# Patient Record
Sex: FEMALE | Race: WHITE | NOT HISPANIC OR LATINO | Employment: OTHER | URBAN - METROPOLITAN AREA
[De-identification: names, ages, dates, MRNs, and addresses within clinical notes are randomized per-mention and may not be internally consistent; named-entity substitution may affect disease eponyms.]

---

## 2017-05-05 DIAGNOSIS — Z12.31 ENCOUNTER FOR SCREENING MAMMOGRAM FOR BREAST CANCER: Primary | ICD-10-CM

## 2017-05-08 ENCOUNTER — OFFICE VISIT (OUTPATIENT)
Dept: INTERNAL MEDICINE | Facility: CLINIC | Age: 62
End: 2017-05-08

## 2017-05-08 ENCOUNTER — APPOINTMENT (OUTPATIENT)
Dept: WOMENS IMAGING | Facility: HOSPITAL | Age: 62
End: 2017-05-08

## 2017-05-08 VITALS
HEIGHT: 63 IN | WEIGHT: 115 LBS | DIASTOLIC BLOOD PRESSURE: 62 MMHG | RESPIRATION RATE: 14 BRPM | BODY MASS INDEX: 20.38 KG/M2 | SYSTOLIC BLOOD PRESSURE: 98 MMHG

## 2017-05-08 DIAGNOSIS — Z23 NEED FOR VACCINATION: ICD-10-CM

## 2017-05-08 DIAGNOSIS — M81.0 OSTEOPOROSIS: ICD-10-CM

## 2017-05-08 DIAGNOSIS — Z00.00 ANNUAL PHYSICAL EXAM: Primary | ICD-10-CM

## 2017-05-08 DIAGNOSIS — Z12.31 ENCOUNTER FOR SCREENING MAMMOGRAM FOR BREAST CANCER: ICD-10-CM

## 2017-05-08 DIAGNOSIS — Z12.11 SCREENING FOR COLON CANCER: ICD-10-CM

## 2017-05-08 DIAGNOSIS — E78.00 HYPERCHOLESTEROLEMIA: ICD-10-CM

## 2017-05-08 LAB
ALBUMIN SERPL-MCNC: 4.27 G/DL (ref 3.4–4.6)
ALBUMIN/GLOB SERPL: 1.5 G/DL
ALP SERPL-CCNC: 69 U/L (ref 46–116)
ALT SERPL W P-5'-P-CCNC: 23 U/L (ref 14–59)
ANION GAP SERPL CALCULATED.3IONS-SCNC: 8 MMOL/L
AST SERPL-CCNC: 17 U/L (ref 7–37)
BACTERIA UR QL AUTO: ABNORMAL /HPF
BASOPHILS # BLD AUTO: 0.01 10*3/MM3 (ref 0–0.2)
BASOPHILS NFR BLD AUTO: 0.2 % (ref 0–1.5)
BILIRUB SERPL-MCNC: 0.4 MG/DL (ref 0.2–1)
BILIRUB UR QL STRIP: NEGATIVE
BUN BLD-MCNC: 17 MG/DL (ref 6–22)
BUN/CREAT SERPL: 22.7 (ref 7–25)
CALCIUM SPEC-SCNC: 9.1 MG/DL (ref 8.6–10.5)
CHLORIDE SERPL-SCNC: 102 MMOL/L (ref 95–107)
CHOLEST SERPL-MCNC: 220 MG/DL (ref 0–200)
CLARITY UR: CLEAR
CO2 SERPL-SCNC: 31 MMOL/L (ref 23–32)
COLOR UR: YELLOW
CREAT BLD-MCNC: 0.75 MG/DL (ref 0.55–1.02)
EOSINOPHIL # BLD AUTO: 0.1 10*3/MM3 (ref 0–0.7)
EOSINOPHIL # BLD AUTO: 1.7 % (ref 0.3–6.2)
ERYTHROCYTE [DISTWIDTH] IN BLOOD BY AUTOMATED COUNT: 12.4 % (ref 11.7–13)
GFR SERPL CREATININE-BSD FRML MDRD: 78 ML/MIN/1.73
GLOBULIN UR ELPH-MCNC: 2.8 GM/DL
GLUCOSE BLD-MCNC: 87 MG/DL (ref 70–100)
GLUCOSE UR STRIP-MCNC: NEGATIVE MG/DL
HCT VFR BLD AUTO: 42.9 % (ref 35.6–45.5)
HDLC SERPL-MCNC: 56 MG/DL (ref 40–81)
HGB BLD-MCNC: 13.8 G/DL (ref 11.9–15.5)
HGB UR QL STRIP.AUTO: ABNORMAL
HYALINE CASTS UR QL AUTO: ABNORMAL /LPF
IMM GRANULOCYTES # BLD: 0 10*3/MM3 (ref 0–0.03)
IMM GRANULOCYTES NFR BLD: 0 % (ref 0–0.5)
KETONES UR QL STRIP: NEGATIVE
LDLC SERPL CALC-MCNC: 132 MG/DL (ref 0–100)
LDLC/HDLC SERPL: 2.36 {RATIO}
LEUKOCYTE ESTERASE UR QL STRIP.AUTO: NEGATIVE
LYMPHOCYTES # BLD AUTO: 2.76 10*3/MM3 (ref 0.9–4.8)
LYMPHOCYTES NFR BLD AUTO: 46.9 % (ref 19.6–45.3)
MCH RBC QN AUTO: 30.3 PG (ref 26.9–32)
MCHC RBC AUTO-ENTMCNC: 32.2 G/DL (ref 32.4–36.3)
MCV RBC AUTO: 94.1 FL (ref 80.5–98.2)
MONOCYTES # BLD AUTO: 0.5 10*3/MM3 (ref 0.2–1.2)
MONOCYTES NFR BLD AUTO: 8.5 % (ref 5–12)
MUCOUS THREADS URNS QL MICRO: ABNORMAL /HPF
NEUTROPHILS # BLD AUTO: 2.51 10*3/MM3 (ref 1.9–8.1)
NEUTROPHILS NFR BLD AUTO: 42.7 % (ref 42.7–76)
NITRITE UR QL STRIP: NEGATIVE
PH UR STRIP.AUTO: <=5 [PH] (ref 5–8)
PLATELET # BLD AUTO: 227 10*3/MM3 (ref 140–500)
POTASSIUM BLD-SCNC: 3.9 MMOL/L (ref 3.3–5.3)
PROT SERPL-MCNC: 7.1 G/DL (ref 6.3–8.4)
PROT UR QL STRIP: NEGATIVE
RBC # BLD AUTO: 4.56 10*6/MM3 (ref 3.9–5.2)
RBC # UR: ABNORMAL /HPF
REF LAB TEST METHOD: ABNORMAL
SODIUM BLD-SCNC: 141 MMOL/L (ref 136–145)
SP GR UR STRIP: 1.01 (ref 1–1.03)
SQUAMOUS #/AREA URNS HPF: ABNORMAL /HPF
TRIGL SERPL-MCNC: 159 MG/DL (ref 0–150)
TSH SERPL DL<=0.05 MIU/L-ACNC: 2.65 MIU/ML (ref 0.4–4.2)
UROBILINOGEN UR QL STRIP: ABNORMAL
VLDLC SERPL-MCNC: 31.8 MG/DL
WBC # BLD AUTO: 5.88 10*3/MM3 (ref 4.5–10.7)
WBC UR QL AUTO: ABNORMAL /HPF

## 2017-05-08 PROCEDURE — 90471 IMMUNIZATION ADMIN: CPT | Performed by: INTERNAL MEDICINE

## 2017-05-08 PROCEDURE — 80061 LIPID PANEL: CPT | Performed by: INTERNAL MEDICINE

## 2017-05-08 PROCEDURE — 81001 URINALYSIS AUTO W/SCOPE: CPT | Performed by: INTERNAL MEDICINE

## 2017-05-08 PROCEDURE — 93000 ELECTROCARDIOGRAM COMPLETE: CPT | Performed by: INTERNAL MEDICINE

## 2017-05-08 PROCEDURE — 77067 SCR MAMMO BI INCL CAD: CPT | Performed by: INTERNAL MEDICINE

## 2017-05-08 PROCEDURE — 99396 PREV VISIT EST AGE 40-64: CPT | Performed by: INTERNAL MEDICINE

## 2017-05-08 PROCEDURE — 77067 SCR MAMMO BI INCL CAD: CPT | Performed by: RADIOLOGY

## 2017-05-08 PROCEDURE — 90736 HZV VACCINE LIVE SUBQ: CPT | Performed by: INTERNAL MEDICINE

## 2017-05-08 PROCEDURE — 84443 ASSAY THYROID STIM HORMONE: CPT | Performed by: INTERNAL MEDICINE

## 2017-05-08 PROCEDURE — 80053 COMPREHEN METABOLIC PANEL: CPT | Performed by: INTERNAL MEDICINE

## 2017-05-09 ENCOUNTER — APPOINTMENT (OUTPATIENT)
Dept: WOMENS IMAGING | Facility: HOSPITAL | Age: 62
End: 2017-05-09

## 2017-06-27 RX ORDER — SERTRALINE HYDROCHLORIDE 100 MG/1
TABLET, FILM COATED ORAL
Qty: 90 TABLET | Refills: 1 | Status: SHIPPED | OUTPATIENT
Start: 2017-06-27 | End: 2017-10-14 | Stop reason: SDUPTHER

## 2017-07-27 RX ORDER — ALENDRONATE SODIUM 70 MG/1
TABLET ORAL
Qty: 12 TABLET | Refills: 0 | Status: SHIPPED | OUTPATIENT
Start: 2017-07-27 | End: 2017-10-08 | Stop reason: SDUPTHER

## 2017-08-24 ENCOUNTER — OFFICE VISIT (OUTPATIENT)
Dept: INTERNAL MEDICINE | Facility: CLINIC | Age: 62
End: 2017-08-24

## 2017-08-24 VITALS
HEART RATE: 71 BPM | WEIGHT: 112 LBS | DIASTOLIC BLOOD PRESSURE: 70 MMHG | BODY MASS INDEX: 19.84 KG/M2 | SYSTOLIC BLOOD PRESSURE: 108 MMHG | OXYGEN SATURATION: 97 % | HEIGHT: 63 IN

## 2017-08-24 DIAGNOSIS — L30.9 DERMATITIS: Primary | ICD-10-CM

## 2017-08-24 PROCEDURE — 99213 OFFICE O/P EST LOW 20 MIN: CPT | Performed by: INTERNAL MEDICINE

## 2017-08-24 RX ORDER — METHYLPREDNISOLONE 4 MG/1
TABLET ORAL
Qty: 21 TABLET | Refills: 0 | Status: SHIPPED | OUTPATIENT
Start: 2017-08-24 | End: 2017-09-25

## 2017-08-24 RX ORDER — BETAMETHASONE DIPROPIONATE 0.5 MG/G
CREAM TOPICAL 2 TIMES DAILY
Qty: 45 G | Refills: 0 | Status: SHIPPED | OUTPATIENT
Start: 2017-08-24 | End: 2017-11-09

## 2017-08-24 NOTE — PROGRESS NOTES
Subjective   Stephanie Ferro is a 61 y.o. female.     Rash   This is a new problem. The current episode started in the past 7 days.        The following portions of the patient's history were reviewed and updated as appropriate: allergies, current medications, past family history, past medical history, past social history, past surgical history and problem list.    Review of Systems   Constitutional:        Here for acuet clinic & rash     Respiratory: Negative.    Cardiovascular: Negative.    Skin: Positive for rash ( Developed a rash on neck & upper chest Sunday Itching & scaley Hasn't spread further Using cortisone cream which helps the itching).       Objective   Physical Exam   Constitutional: She appears well-developed.   HENT:   Head: Normocephalic.   Neck: Neck supple.   Cardiovascular: Normal rate, regular rhythm and normal heart sounds.    Pulmonary/Chest: Effort normal and breath sounds normal.   Skin: Rash (Has dry scaley rash arond neck ,upper back, & upper chest) noted.   Vitals reviewed.      Assessment/Plan   Stephanie was seen today for rash.    Diagnoses and all orders for this visit:    Dermatitis  -     betamethasone dipropionate (DIPROLENE) 0.05 % cream; Apply  topically 2 (Two) Times a Day.  -     MethylPREDNISolone (MEDROL) 4 MG tablet; follow package directions

## 2017-08-25 ENCOUNTER — TELEPHONE (OUTPATIENT)
Dept: INTERNAL MEDICINE | Facility: CLINIC | Age: 62
End: 2017-08-25

## 2017-08-25 NOTE — TELEPHONE ENCOUNTER
----- Message from Ne Aldrich sent at 8/24/2017  2:45 PM EDT -----  Contact: [facility]   [facility] for [facility] on     betamethasone dipropionate (DIPROLENE) 0.05 % cream    Is this Rx for augmented or regular?  Please advise.     Pharmacy:  [facility] [id number] [place] [address] & [place] - [phone] [facility] [phone] [facility]

## 2017-09-25 ENCOUNTER — OFFICE VISIT (OUTPATIENT)
Dept: INTERNAL MEDICINE | Facility: CLINIC | Age: 62
End: 2017-09-25

## 2017-09-25 VITALS
SYSTOLIC BLOOD PRESSURE: 114 MMHG | DIASTOLIC BLOOD PRESSURE: 76 MMHG | HEIGHT: 63 IN | BODY MASS INDEX: 19.67 KG/M2 | WEIGHT: 111 LBS

## 2017-09-25 DIAGNOSIS — N75.0 BARTHOLIN CYST: Primary | ICD-10-CM

## 2017-09-25 PROCEDURE — 99212 OFFICE O/P EST SF 10 MIN: CPT | Performed by: INTERNAL MEDICINE

## 2017-09-25 NOTE — PROGRESS NOTES
"Chief Complaint   Patient presents with   • vaginal soreness     knot in vaginal area        Subjective   Stephanie Ferro is a 61 y.o. female     HPI:     The following portions of the patient's history were reviewed and updated as appropriate: allergies, current medications, past social history, problem list, past surgical history    Review of Systems        Objective     /76  Ht 62.5\" (158.8 cm)  Wt 111 lb (50.3 kg)  BMI 19.98 kg/m2     Physical Exam   Pulmonary/Chest: Effort normal.   Genitourinary: There is tenderness (tenderness, swelling left labia minora) on the left labia.   Nursing note and vitals reviewed.      Assessment/Plan   Problem List Items Addressed This Visit     None      Visit Diagnoses     Bartholin cyst    -  Primary    Relevant Orders    Ambulatory Referral to Gynecology (Completed)      Likely Bartholin's cyst.  At this time, does not appear infected.       "

## 2017-10-09 RX ORDER — ALENDRONATE SODIUM 70 MG/1
TABLET ORAL
Qty: 12 TABLET | Refills: 0 | Status: SHIPPED | OUTPATIENT
Start: 2017-10-09 | End: 2018-02-07 | Stop reason: SDUPTHER

## 2017-10-16 RX ORDER — SERTRALINE HYDROCHLORIDE 100 MG/1
TABLET, FILM COATED ORAL
Qty: 90 TABLET | Refills: 0 | Status: SHIPPED | OUTPATIENT
Start: 2017-10-16 | End: 2018-02-27 | Stop reason: SDUPTHER

## 2017-11-09 ENCOUNTER — OFFICE VISIT (OUTPATIENT)
Dept: INTERNAL MEDICINE | Facility: CLINIC | Age: 62
End: 2017-11-09

## 2017-11-09 ENCOUNTER — CLINICAL SUPPORT (OUTPATIENT)
Dept: INTERNAL MEDICINE | Facility: CLINIC | Age: 62
End: 2017-11-09

## 2017-11-09 VITALS
BODY MASS INDEX: 19.67 KG/M2 | SYSTOLIC BLOOD PRESSURE: 104 MMHG | WEIGHT: 111 LBS | HEIGHT: 63 IN | DIASTOLIC BLOOD PRESSURE: 68 MMHG

## 2017-11-09 DIAGNOSIS — M81.0 OSTEOPOROSIS: ICD-10-CM

## 2017-11-09 DIAGNOSIS — E78.5 HYPERLIPIDEMIA, UNSPECIFIED HYPERLIPIDEMIA TYPE: Primary | ICD-10-CM

## 2017-11-09 LAB
CHOLEST SERPL-MCNC: 266 MG/DL (ref 0–200)
HDLC SERPL-MCNC: 52 MG/DL (ref 40–60)
LDLC SERPL CALC-MCNC: 177 MG/DL (ref 0–100)
TRIGL SERPL-MCNC: 187 MG/DL (ref 0–150)
VLDLC SERPL-MCNC: 37.4 MG/DL (ref 5–40)

## 2017-11-09 PROCEDURE — 99212 OFFICE O/P EST SF 10 MIN: CPT | Performed by: INTERNAL MEDICINE

## 2017-11-09 PROCEDURE — 77080 DXA BONE DENSITY AXIAL: CPT | Performed by: INTERNAL MEDICINE

## 2017-11-09 NOTE — PROGRESS NOTES
"Chief Complaint   Patient presents with   • Hyperlipidemia     6 month follow up        Subjective   Stephanie Ferro is a 62 y.o. female     HPI: Hyperlipidemia:  This is a chronic problem.   No management changes were made at her last appointment.   Her most recent lipid panel was done on 5/8/2017.  Total cholesterol was 220, Triglycerides 159, HDL 56, .   Current treatment: dietary modification.            The following portions of the patient's history were reviewed and updated as appropriate: allergies, current medications, past social history, problem list, past surgical history    Review of Systems   Constitutional: Negative for activity change and appetite change.   HENT: Negative for nosebleeds.    Eyes: Negative for visual disturbance.   Respiratory: Negative for cough and shortness of breath.    Cardiovascular: Negative for chest pain, palpitations and leg swelling.   Neurological: Negative for headaches.   Psychiatric/Behavioral: Negative for sleep disturbance.           Objective     /68  Ht 62.5\" (158.8 cm)  Wt 111 lb (50.3 kg)  BMI 19.98 kg/m2     Physical Exam   Constitutional: She is oriented to person, place, and time. She appears well-developed and well-nourished. No distress.   Neck: Normal carotid pulses present. Carotid bruit is not present.   Cardiovascular: Normal rate, regular rhythm, S1 normal, S2 normal and intact distal pulses.  Exam reveals no gallop and no friction rub.    No murmur heard.  Pulses:       Carotid pulses are 2+ on the right side, and 2+ on the left side.  Pulmonary/Chest: Effort normal and breath sounds normal. No respiratory distress. She has no wheezes. She has no rhonchi. She has no rales. Chest wall is not dull to percussion.   Musculoskeletal: She exhibits no edema.   Neurological: She is alert and oriented to person, place, and time.   Skin: Skin is warm and dry.   Nursing note and vitals reviewed.      Assessment/Plan   Problem List Items Addressed " This Visit        Cardiovascular and Mediastinum    Hyperlipidemia - Primary    Relevant Orders    Lipid Panel      She has referral for colonoscopy and will follow through on it.

## 2017-11-10 RX ORDER — ROSUVASTATIN CALCIUM 5 MG/1
5 TABLET, COATED ORAL DAILY
Qty: 90 TABLET | Refills: 1 | Status: SHIPPED | OUTPATIENT
Start: 2017-11-10 | End: 2017-12-14

## 2017-12-14 ENCOUNTER — OFFICE VISIT (OUTPATIENT)
Dept: INTERNAL MEDICINE | Facility: CLINIC | Age: 62
End: 2017-12-14

## 2017-12-14 VITALS
DIASTOLIC BLOOD PRESSURE: 74 MMHG | BODY MASS INDEX: 20.16 KG/M2 | HEART RATE: 73 BPM | TEMPERATURE: 98.4 F | SYSTOLIC BLOOD PRESSURE: 112 MMHG | WEIGHT: 112 LBS | OXYGEN SATURATION: 98 %

## 2017-12-14 DIAGNOSIS — J02.9 ACUTE PHARYNGITIS, UNSPECIFIED ETIOLOGY: Primary | ICD-10-CM

## 2017-12-14 DIAGNOSIS — J06.9 ACUTE URI: ICD-10-CM

## 2017-12-14 LAB
EXPIRATION DATE: NORMAL
INTERNAL CONTROL: NORMAL
Lab: NORMAL
S PYO AG THROAT QL: NEGATIVE

## 2017-12-14 PROCEDURE — 87880 STREP A ASSAY W/OPTIC: CPT | Performed by: NURSE PRACTITIONER

## 2017-12-14 PROCEDURE — 99213 OFFICE O/P EST LOW 20 MIN: CPT | Performed by: NURSE PRACTITIONER

## 2017-12-14 RX ORDER — AMOXICILLIN 875 MG/1
875 TABLET, COATED ORAL 2 TIMES DAILY
Qty: 14 TABLET | Refills: 0 | Status: SHIPPED | OUTPATIENT
Start: 2017-12-14 | End: 2017-12-21

## 2017-12-14 RX ORDER — GUAIFENESIN 600 MG/1
600 TABLET, EXTENDED RELEASE ORAL 2 TIMES DAILY
Qty: 14 TABLET | Refills: 0
Start: 2017-12-14 | End: 2017-12-21

## 2017-12-14 NOTE — PROGRESS NOTES
Subjective   Stephanie Ferro is a 62 y.o. female.     HPI Comments: No recent travel. Her spouse has been sick.     URI    This is a new problem. The current episode started yesterday. The problem has been gradually worsening. There has been no fever. Associated symptoms include chest pain (r/t coughing ), congestion, coughing (productive ), headaches, rhinorrhea, sneezing and a sore throat. Pertinent negatives include no abdominal pain, diarrhea, ear pain, nausea, sinus pain, vomiting or wheezing. She has tried antihistamine (ibuprofen, ) for the symptoms. The treatment provided mild relief.        The following portions of the patient's history were reviewed and updated as appropriate: allergies, current medications and problem list.    Review of Systems   Constitutional: Negative for appetite change, chills, fatigue and fever.   HENT: Positive for congestion, rhinorrhea, sneezing and sore throat. Negative for ear discharge, ear pain, facial swelling, hearing loss, postnasal drip, sinus pain, sinus pressure and tinnitus.    Respiratory: Positive for cough (productive ). Negative for chest tightness, shortness of breath and wheezing.    Cardiovascular: Positive for chest pain (r/t coughing ). Negative for palpitations and leg swelling.   Gastrointestinal: Negative for abdominal pain, diarrhea, nausea and vomiting.   Musculoskeletal: Negative for myalgias.   Allergic/Immunologic: Positive for environmental allergies.   Neurological: Positive for headaches.   Hematological: Negative for adenopathy.       Objective   Physical Exam   Constitutional: She appears well-developed and well-nourished. She is cooperative. She does not have a sickly appearance. She does not appear ill.   HENT:   Head: Normocephalic.   Right Ear: Hearing, tympanic membrane and external ear normal. No drainage, swelling or tenderness. No mastoid tenderness. Tympanic membrane is not injected, not scarred, not erythematous and not bulging. Tympanic  membrane mobility is normal. No middle ear effusion. No decreased hearing is noted.   Left Ear: Hearing, tympanic membrane and external ear normal. No drainage, swelling or tenderness. No mastoid tenderness. Tympanic membrane is not injected, not scarred, not erythematous and not bulging. Tympanic membrane mobility is normal.  No middle ear effusion. No decreased hearing is noted.   Nose: Mucosal edema and rhinorrhea present. No sinus tenderness or nasal deformity. Right sinus exhibits no maxillary sinus tenderness and no frontal sinus tenderness. Left sinus exhibits no maxillary sinus tenderness and no frontal sinus tenderness.   Mouth/Throat: Mucous membranes are normal. Normal dentition. Posterior oropharyngeal erythema (mild) present.   Eyes: Conjunctivae and lids are normal. Pupils are equal, round, and reactive to light. Right eye exhibits no discharge and no exudate. Left eye exhibits no discharge and no exudate.   Neck: Trachea normal and normal range of motion. No edema present. No thyroid mass and no thyromegaly present.   Cardiovascular: Regular rhythm, normal heart sounds and normal pulses.    No murmur heard.  Pulmonary/Chest: Breath sounds normal. No respiratory distress. She has no decreased breath sounds. She has no wheezes. She has no rhonchi. She has no rales.   Dry cough    Lymphadenopathy:        Head (right side): No submental, no submandibular, no tonsillar, no preauricular, no posterior auricular and no occipital adenopathy present.        Head (left side): No submental, no submandibular, no tonsillar, no preauricular, no posterior auricular and no occipital adenopathy present.   Neurological: She is alert.   Skin: Skin is warm, dry and intact. No cyanosis. Nails show no clubbing.       Assessment/Plan   Stephanie was seen today for uri.    Diagnoses and all orders for this visit:    Acute pharyngitis, unspecified etiology  Comments:  warm salt gargles   Orders:  -     POC Rapid Strep A    Acute  URI  Comments:  drink plenty of water; may wait 3-4 days and start antibiotic if needed   Orders:  -     guaiFENesin (MUCINEX) 600 MG 12 hr tablet; Take 1 tablet by mouth 2 (Two) Times a Day for 7 days.  -     amoxicillin (AMOXIL) 875 MG tablet; Take 1 tablet by mouth 2 (Two) Times a Day for 7 days.    Rapid strep negative. She will wait 3-4 days and start antibiotic if worsening of symptoms. May return if needed

## 2017-12-14 NOTE — PATIENT INSTRUCTIONS
"Upper Respiratory Infection, Adult  Most upper respiratory infections (URIs) are a viral infection of the air passages leading to the lungs. A URI affects the nose, throat, and upper air passages. The most common type of URI is nasopharyngitis and is typically referred to as \"the common cold.\"  URIs run their course and usually go away on their own. Most of the time, a URI does not require medical attention, but sometimes a bacterial infection in the upper airways can follow a viral infection. This is called a secondary infection. Sinus and middle ear infections are common types of secondary upper respiratory infections.  Bacterial pneumonia can also complicate a URI. A URI can worsen asthma and chronic obstructive pulmonary disease (COPD). Sometimes, these complications can require emergency medical care and may be life threatening.   CAUSES  Almost all URIs are caused by viruses. A virus is a type of germ and can spread from one person to another.   RISKS FACTORS  You may be at risk for a URI if:   · You smoke.    · You have chronic heart or lung disease.  · You have a weakened defense (immune) system.    · You are very young or very old.    · You have nasal allergies or asthma.  · You work in crowded or poorly ventilated areas.  · You work in health care facilities or schools.  SIGNS AND SYMPTOMS   Symptoms typically develop 2-3 days after you come in contact with a cold virus. Most viral URIs last 7-10 days. However, viral URIs from the influenza virus (flu virus) can last 14-18 days and are typically more severe. Symptoms may include:   · Runny or stuffy (congested) nose.    · Sneezing.    · Cough.    · Sore throat.    · Headache.    · Fatigue.    · Fever.    · Loss of appetite.    · Pain in your forehead, behind your eyes, and over your cheekbones (sinus pain).  · Muscle aches.    DIAGNOSIS   Your health care provider may diagnose a URI by:  · Physical exam.  · Tests to check that your symptoms are not due to " another condition such as:  ¨ Strep throat.  ¨ Sinusitis.  ¨ Pneumonia.  ¨ Asthma.  TREATMENT   A URI goes away on its own with time. It cannot be cured with medicines, but medicines may be prescribed or recommended to relieve symptoms. Medicines may help:  · Reduce your fever.  · Reduce your cough.  · Relieve nasal congestion.  HOME CARE INSTRUCTIONS   · Take medicines only as directed by your health care provider.    · Gargle warm saltwater or take cough drops to comfort your throat as directed by your health care provider.  · Use a warm mist humidifier or inhale steam from a shower to increase air moisture. This may make it easier to breathe.  · Drink enough fluid to keep your urine clear or pale yellow.    · Eat soups and other clear broths and maintain good nutrition.    · Rest as needed.    · Return to work when your temperature has returned to normal or as your health care provider advises. You may need to stay home longer to avoid infecting others. You can also use a face mask and careful hand washing to prevent spread of the virus.  · Increase the usage of your inhaler if you have asthma.    · Do not use any tobacco products, including cigarettes, chewing tobacco, or electronic cigarettes. If you need help quitting, ask your health care provider.  PREVENTION   The best way to protect yourself from getting a cold is to practice good hygiene.   · Avoid oral or hand contact with people with cold symptoms.    · Wash your hands often if contact occurs.    There is no clear evidence that vitamin C, vitamin E, echinacea, or exercise reduces the chance of developing a cold. However, it is always recommended to get plenty of rest, exercise, and practice good nutrition.   SEEK MEDICAL CARE IF:   · You are getting worse rather than better.    · Your symptoms are not controlled by medicine.    · You have chills.  · You have worsening shortness of breath.  · You have brown or red mucus.  · You have yellow or brown nasal  discharge.  · You have pain in your face, especially when you bend forward.  · You have a fever.  · You have swollen neck glands.  · You have pain while swallowing.  · You have white areas in the back of your throat.  SEEK IMMEDIATE MEDICAL CARE IF:   · You have severe or persistent:    Headache.    Ear pain.    Sinus pain.    Chest pain.  · You have chronic lung disease and any of the following:    Wheezing.    Prolonged cough.    Coughing up blood.    A change in your usual mucus.  · You have a stiff neck.  · You have changes in your:    Vision.    Hearing.    Thinking.    Mood.  MAKE SURE YOU:   · Understand these instructions.  · Will watch your condition.  · Will get help right away if you are not doing well or get worse.     This information is not intended to replace advice given to you by your health care provider. Make sure you discuss any questions you have with your health care provider.     Document Released: 06/13/2002 Document Revised: 05/03/2016 Document Reviewed: 03/25/2015  [employer] Interactive Patient Education ©2017 [employer]  Pharyngitis  Pharyngitis is redness, pain, and swelling (inflammation) of your pharynx.   CAUSES   Pharyngitis is usually caused by infection. Most of the time, these infections are from viruses (viral) and are part of a cold. However, sometimes pharyngitis is caused by bacteria (bacterial). Pharyngitis can also be caused by allergies. Viral pharyngitis may be spread from person to person by coughing, sneezing, and personal items or utensils (cups, forks, spoons, toothbrushes). Bacterial pharyngitis may be spread from person to person by more intimate contact, such as kissing.   SIGNS AND SYMPTOMS   Symptoms of pharyngitis include:    · Sore throat.    · Tiredness (fatigue).    · Low-grade fever.    · Headache.  · Joint pain and muscle aches.  · Skin rashes.  · Swollen lymph nodes.  · Plaque-like film on throat or tonsils (often seen with bacterial  pharyngitis).  DIAGNOSIS   Your health care provider will ask you questions about your illness and your symptoms. Your medical history, along with a physical exam, is often all that is needed to diagnose pharyngitis. Sometimes, a rapid strep test is done. Other lab tests may also be done, depending on the suspected cause.   TREATMENT   Viral pharyngitis will usually get better in 3-4 days without the use of medicine. Bacterial pharyngitis is treated with medicines that kill germs (antibiotics).   HOME CARE INSTRUCTIONS   · Drink enough water and fluids to keep your urine clear or pale yellow.    · Only take over-the-counter or prescription medicines as directed by your health care provider:      If you are prescribed antibiotics, make sure you finish them even if you start to feel better.      Do not take aspirin.    · Get lots of rest.    · Gargle with 8 oz of salt water (½ tsp of salt per 1 qt of water) as often as every 1-2 hours to soothe your throat.    · Throat lozenges (if you are not at risk for choking) or sprays may be used to soothe your throat.  SEEK MEDICAL CARE IF:   · You have large, tender lumps in your neck.  · You have a rash.  · You cough up green, yellow-brown, or bloody spit.  SEEK IMMEDIATE MEDICAL CARE IF:   · Your neck becomes stiff.  · You drool or are unable to swallow liquids.  · You vomit or are unable to keep medicines or liquids down.  · You have severe pain that does not go away with the use of recommended medicines.  · You have trouble breathing (not caused by a stuffy nose).  MAKE SURE YOU:   · Understand these instructions.  · Will watch your condition.  · Will get help right away if you are not doing well or get worse.     This information is not intended to replace advice given to you by your health care provider. Make sure you discuss any questions you have with your health care provider.     Document Released: 12/18/2006 Document Revised: 10/08/2014 Document Reviewed:  08/25/2014  [employer] Interactive Patient Education ©2017 [employer]

## 2018-02-07 RX ORDER — ALENDRONATE SODIUM 70 MG/1
TABLET ORAL
Qty: 12 TABLET | Refills: 2 | Status: SHIPPED | OUTPATIENT
Start: 2018-02-07 | End: 2018-12-17 | Stop reason: SDUPTHER

## 2018-02-27 RX ORDER — TRAZODONE HYDROCHLORIDE 50 MG/1
TABLET ORAL
Qty: 90 TABLET | Refills: 1 | Status: SHIPPED | OUTPATIENT
Start: 2018-02-27 | End: 2019-01-05 | Stop reason: SDUPTHER

## 2018-02-27 RX ORDER — SERTRALINE HYDROCHLORIDE 100 MG/1
TABLET, FILM COATED ORAL
Qty: 90 TABLET | Refills: 1 | Status: SHIPPED | OUTPATIENT
Start: 2018-02-27 | End: 2018-11-27 | Stop reason: SDUPTHER

## 2018-05-08 DIAGNOSIS — Z12.31 ENCOUNTER FOR SCREENING MAMMOGRAM FOR BREAST CANCER: Primary | ICD-10-CM

## 2018-05-10 ENCOUNTER — OFFICE VISIT (OUTPATIENT)
Dept: INTERNAL MEDICINE | Facility: CLINIC | Age: 63
End: 2018-05-10

## 2018-05-10 VITALS
DIASTOLIC BLOOD PRESSURE: 70 MMHG | BODY MASS INDEX: 20.02 KG/M2 | SYSTOLIC BLOOD PRESSURE: 104 MMHG | WEIGHT: 113 LBS | HEIGHT: 63 IN

## 2018-05-10 DIAGNOSIS — Z12.11 SCREENING FOR COLON CANCER: ICD-10-CM

## 2018-05-10 DIAGNOSIS — Z00.00 ANNUAL PHYSICAL EXAM: Primary | ICD-10-CM

## 2018-05-10 LAB
ALBUMIN SERPL-MCNC: 4.7 G/DL (ref 3.5–5.2)
ALBUMIN/GLOB SERPL: 1.8 G/DL
ALP SERPL-CCNC: 55 U/L (ref 39–117)
ALT SERPL W P-5'-P-CCNC: 15 U/L (ref 1–33)
ANION GAP SERPL CALCULATED.3IONS-SCNC: 12.2 MMOL/L
AST SERPL-CCNC: 14 U/L (ref 1–32)
BILIRUB SERPL-MCNC: 0.3 MG/DL (ref 0.1–1.2)
BILIRUB UR QL STRIP: NEGATIVE
BUN BLD-MCNC: 13 MG/DL (ref 8–23)
BUN/CREAT SERPL: 16.3 (ref 7–25)
CALCIUM SPEC-SCNC: 9.7 MG/DL (ref 8.6–10.5)
CHLORIDE SERPL-SCNC: 100 MMOL/L (ref 98–107)
CHOLEST SERPL-MCNC: 228 MG/DL (ref 0–200)
CLARITY UR: ABNORMAL
CO2 SERPL-SCNC: 28.8 MMOL/L (ref 22–29)
COLOR UR: YELLOW
CREAT BLD-MCNC: 0.8 MG/DL (ref 0.57–1)
DEPRECATED RDW RBC AUTO: 39.4 FL (ref 37–54)
ERYTHROCYTE [DISTWIDTH] IN BLOOD BY AUTOMATED COUNT: 12 % (ref 11.5–15)
GFR SERPL CREATININE-BSD FRML MDRD: 72 ML/MIN/1.73
GLOBULIN UR ELPH-MCNC: 2.6 GM/DL
GLUCOSE BLD-MCNC: 87 MG/DL (ref 65–99)
GLUCOSE UR STRIP-MCNC: NEGATIVE MG/DL
HCT VFR BLD AUTO: 43.2 % (ref 34.1–44.9)
HDLC SERPL-MCNC: 50 MG/DL (ref 40–60)
HGB BLD-MCNC: 14.1 G/DL (ref 11.2–15.7)
HGB UR QL STRIP.AUTO: NEGATIVE
KETONES UR QL STRIP: NEGATIVE
LDLC SERPL CALC-MCNC: 148 MG/DL (ref 0–100)
LDLC/HDLC SERPL: 2.96 {RATIO}
LEUKOCYTE ESTERASE UR QL STRIP.AUTO: NEGATIVE
MCH RBC QN AUTO: 30.1 PG (ref 26–34)
MCHC RBC AUTO-ENTMCNC: 32.6 G/DL (ref 31–37)
MCV RBC AUTO: 92.1 FL (ref 80–100)
NITRITE UR QL STRIP: NEGATIVE
PH UR STRIP.AUTO: 7 [PH] (ref 5–8)
PLATELET # BLD AUTO: 183 10*3/MM3 (ref 150–450)
PMV BLD AUTO: 10 FL (ref 6–12)
POTASSIUM BLD-SCNC: 4.2 MMOL/L (ref 3.5–5.2)
PROT SERPL-MCNC: 7.3 G/DL (ref 6–8.5)
PROT UR QL STRIP: ABNORMAL
RBC # BLD AUTO: 4.69 10*6/MM3 (ref 3.93–5.22)
SODIUM BLD-SCNC: 141 MMOL/L (ref 136–145)
SP GR UR STRIP: 1.02 (ref 1–1.03)
TRIGL SERPL-MCNC: 151 MG/DL (ref 0–150)
TSH SERPL DL<=0.05 MIU/L-ACNC: 3.35 MIU/ML (ref 0.27–4.2)
UROBILINOGEN UR QL STRIP: ABNORMAL
VLDLC SERPL-MCNC: 30.2 MG/DL (ref 5–40)
WBC NRBC COR # BLD: 5.19 10*3/MM3 (ref 5–10)

## 2018-05-10 PROCEDURE — 80053 COMPREHEN METABOLIC PANEL: CPT | Performed by: INTERNAL MEDICINE

## 2018-05-10 PROCEDURE — 81003 URINALYSIS AUTO W/O SCOPE: CPT | Performed by: INTERNAL MEDICINE

## 2018-05-10 PROCEDURE — 80061 LIPID PANEL: CPT | Performed by: INTERNAL MEDICINE

## 2018-05-10 PROCEDURE — 84443 ASSAY THYROID STIM HORMONE: CPT | Performed by: INTERNAL MEDICINE

## 2018-05-10 PROCEDURE — 85027 COMPLETE CBC AUTOMATED: CPT | Performed by: INTERNAL MEDICINE

## 2018-05-10 PROCEDURE — 99396 PREV VISIT EST AGE 40-64: CPT | Performed by: INTERNAL MEDICINE

## 2018-05-10 NOTE — PROGRESS NOTES
"Chief Complaint   Patient presents with   • Annual Exam     physical       Subjective   Stephaine Ferro is a 62 y.o. female.     History of Present Illness     She is here for annual examination.    She generally feels healthy.    Her  appetite is good.    She  follows a prudent diet.   She sleeps well.    Her  energy is good.     She exercises   She is taking roseanna red to lower cholesterol.  She has not had recent dental or eye exam.  She is due for colonoscopy.  Sisters have been told they have pre-diabetes.          The following portions of the patient's history were reviewed and updated as appropriate: allergies, current medications, past family history, past medical history, past social history, past surgical history and problem list.    Review of Systems   Constitutional: Negative for chills, fatigue, fever, unexpected weight gain and unexpected weight loss.   HENT: Negative for ear pain, nosebleeds, sinus pressure and trouble swallowing.    Eyes: Negative for blurred vision and double vision.   Respiratory: Negative for cough and shortness of breath.    Cardiovascular: Negative for chest pain, palpitations and leg swelling.   Gastrointestinal: Positive for diarrhea (hss \"loose\" bowels at times.). Negative for abdominal pain, constipation, nausea and vomiting.   Endocrine: Negative for cold intolerance, heat intolerance, polydipsia and polyuria.   Genitourinary: Negative for dysuria and frequency.   Musculoskeletal: Positive for arthralgias (knees shoulder at times). Negative for back pain.   Skin: Negative for rash.   Neurological: Negative for syncope and headache.   Hematological: Negative for adenopathy. Does not bruise/bleed easily.   Psychiatric/Behavioral: Negative for sleep disturbance and depressed mood. The patient is not nervous/anxious.          Current Outpatient Prescriptions:   •  alendronate (FOSAMAX) 70 MG tablet, TAKE ONE TABLET BY MOUTH ONCE WEEKLY, Disp: 12 tablet, Rfl: 2  •  sertraline " "(ZOLOFT) 100 MG tablet, TAKE ONE TABLET BY MOUTH DAILY, Disp: 90 tablet, Rfl: 1  •  traZODone (DESYREL) 50 MG tablet, TAKE ONE-HALF TO ONE TABLET BY MOUTH EVERY NIGHT AT BEDTIME, Disp: 90 tablet, Rfl: 1        Objective     /70   Ht 158.8 cm (62.5\")   Wt 51.3 kg (113 lb)   BMI 20.34 kg/m²     Physical Exam   Constitutional: She is oriented to person, place, and time. She appears well-developed and well-nourished. No distress.   HENT:   Right Ear: Tympanic membrane and ear canal normal.   Left Ear: Tympanic membrane and ear canal normal.   Nose: Right sinus exhibits no maxillary sinus tenderness and no frontal sinus tenderness. Left sinus exhibits no maxillary sinus tenderness and no frontal sinus tenderness.   Mouth/Throat: Oropharynx is clear and moist.   Eyes: Conjunctivae and EOM are normal. Pupils are equal, round, and reactive to light. No scleral icterus.   Neck: Normal range of motion. Neck supple. Normal carotid pulses present. Carotid bruit is not present. No tracheal deviation present. No thyromegaly present.   Cardiovascular: Regular rhythm, S1 normal, S2 normal and intact distal pulses.  Exam reveals no gallop and no friction rub.    No murmur heard.  Pulses:       Carotid pulses are 2+ on the right side, and 2+ on the left side.  Pulmonary/Chest: Effort normal and breath sounds normal. She has no wheezes. She has no rhonchi. She has no rales. Chest wall is not dull to percussion. Right breast exhibits no inverted nipple, no mass, no nipple discharge, no skin change and no tenderness. Left breast exhibits no inverted nipple, no mass, no nipple discharge, no skin change and no tenderness.   Abdominal: Soft. Normal appearance and bowel sounds are normal. She exhibits no abdominal bruit. There is no hepatosplenomegaly. There is no tenderness. There is no rebound and no guarding.   Musculoskeletal: She exhibits no edema.   Lymphadenopathy:     She has no cervical adenopathy.   Neurological: She is " alert and oriented to person, place, and time. No cranial nerve deficit. Coordination normal.   Skin: Skin is warm and dry.   Psychiatric: She has a normal mood and affect.   Nursing note and vitals reviewed.        Assessment/Plan   Stephanie was seen today for annual exam.    Diagnoses and all orders for this visit:    Annual physical exam  -     Comprehensive Metabolic Panel; Future  -     CBC (No Diff); Future  -     Urinalysis With / Microscopic If Indicated - Urine, Clean Catch; Future  -     Lipid Panel; Future  -     TSH Rfx On Abnormal To Free T4; Future  -     Comprehensive Metabolic Panel  -     CBC (No Diff)  -     Urinalysis With / Microscopic If Indicated - Urine, Clean Catch  -     Lipid Panel  -     TSH Rfx On Abnormal To Free T4    Screening for colon cancer  -     Ambulatory Referral For Screening Colonoscopy

## 2018-05-10 NOTE — PATIENT INSTRUCTIONS
"Fat and Cholesterol Restricted Diet  Getting too much fat and cholesterol in your diet may cause health problems. Following this diet helps keep your fat and cholesterol at normal levels. This can keep you from getting sick.  What types of fat should I choose?  · Choose monosaturated and polyunsaturated fats. These are found in foods such as olive oil, canola oil, flaxseeds, walnuts, almonds, and seeds.  · Eat more omega-3 fats. Good choices include salmon, mackerel, sardines, tuna, flaxseed oil, and ground flaxseeds.  · Limit saturated fats. These are in animal products such as meats, butter, and cream. They can also be in plant products such as palm oil, palm kernel oil, and coconut oil.  · Avoid foods with partially hydrogenated oils in them. These contain trans fats. Examples of foods that have trans fats are stick margarine, some tub margarines, cookies, crackers, and other baked goods.  What general guidelines do I need to follow?  · Check food labels. Look for the words \"trans fat\" and \"saturated fat.\"  · When preparing a meal:  ¨ Fill half of your plate with vegetables and green salads.  ¨ Fill one fourth of your plate with whole grains. Look for the word \"whole\" as the first word in the ingredient list.  ¨ Fill one fourth of your plate with lean protein foods.  · Eat more foods that have fiber, like apples, carrots, beans, peas, and barley.  · Eat more home-cooked foods. Eat less at restaurants and buffets.  · Limit or avoid alcohol.  · Limit foods high in starch and sugar.  · Limit fried foods.  · Cook foods without frying them. Baking, boiling, grilling, and broiling are all great options.  · Lose weight if you are overweight. Losing even a small amount of weight can help your overall health. It can also help prevent diseases such as diabetes and heart disease.  What foods can I eat?  Grains   Whole grains, such as whole wheat or whole grain breads, crackers, cereals, and pasta. Unsweetened oatmeal, " bulgur, barley, quinoa, or brown rice. Corn or whole wheat flour tortillas.  Vegetables   Fresh or frozen vegetables (raw, steamed, roasted, or grilled). Green salads.  Fruits   All fresh, canned (in natural juice), or frozen fruits.  Meat and Other Protein Products   Ground beef (85% or leaner), grass-fed beef, or beef trimmed of fat. Skinless chicken or turkey. Ground chicken or turkey. Pork trimmed of fat. All fish and seafood. Eggs. Dried beans, peas, or lentils. Unsalted nuts or seeds. Unsalted canned or dry beans.  Dairy   Low-fat dairy products, such as skim or 1% milk, 2% or reduced-fat cheeses, low-fat ricotta or cottage cheese, or plain low-fat yogurt.  Fats and Oils   Tub margarines without trans fats. Light or reduced-fat mayonnaise and salad dressings. Avocado. Olive, canola, sesame, or safflower oils. Natural peanut or almond butter (choose ones without added sugar and oil).  The items listed above may not be a complete list of recommended foods or beverages. Contact your dietitian for more options.   What foods are not recommended?  Grains   White bread. White pasta. White rice. Cornbread. Bagels, pastries, and croissants. Crackers that contain trans fat.  Vegetables   White potatoes. Corn. Creamed or fried vegetables. Vegetables in a cheese sauce.  Fruits   Dried fruits. Canned fruit in light or heavy syrup. Fruit juice.  Meat and Other Protein Products   Fatty cuts of meat. Ribs, chicken wings, diaz, sausage, bologna, salami, chitterlings, fatback, hot dogs, bratwurst, and packaged luncheon meats. Liver and organ meats.  Dairy   Whole or 2% milk, cream, half-and-half, and cream cheese. Whole milk cheeses. Whole-fat or sweetened yogurt. Full-fat cheeses. Nondairy creamers and whipped toppings. Processed cheese, cheese spreads, or cheese curds.  Sweets and Desserts   Corn syrup, sugars, honey, and molasses. Candy. Jam and jelly. Syrup. Sweetened cereals. Cookies, pies, cakes, donuts, muffins, and ice  cream.  Fats and Oils   Butter, stick margarine, lard, shortening, ghee, or diaz fat. Coconut, palm kernel, or palm oils.  Beverages   Alcohol. Sweetened drinks (such as sodas, lemonade, and fruit drinks or punches).  The items listed above may not be a complete list of foods and beverages to avoid. Contact your dietitian for more information.   This information is not intended to replace advice given to you by your health care provider. Make sure you discuss any questions you have with your health care provider.  Document Released: 06/18/2013 Document Revised: 08/24/2017 Document Reviewed: 03/19/2015  [employer] Interactive Patient Education © 2017 [employer]

## 2018-05-15 ENCOUNTER — OFFICE VISIT (OUTPATIENT)
Dept: INTERNAL MEDICINE | Facility: CLINIC | Age: 63
End: 2018-05-15

## 2018-05-15 ENCOUNTER — APPOINTMENT (OUTPATIENT)
Dept: WOMENS IMAGING | Facility: HOSPITAL | Age: 63
End: 2018-05-15

## 2018-05-15 DIAGNOSIS — Z12.31 ENCOUNTER FOR SCREENING MAMMOGRAM FOR BREAST CANCER: ICD-10-CM

## 2018-05-15 PROCEDURE — 77067 SCR MAMMO BI INCL CAD: CPT | Performed by: INTERNAL MEDICINE

## 2018-05-15 PROCEDURE — 77067 SCR MAMMO BI INCL CAD: CPT | Performed by: RADIOLOGY

## 2018-05-31 ENCOUNTER — TELEPHONE (OUTPATIENT)
Dept: INTERNAL MEDICINE | Facility: CLINIC | Age: 63
End: 2018-05-31

## 2018-05-31 NOTE — TELEPHONE ENCOUNTER
Spoke with patient answered her questions her last 3 mammograms have all similar results with no changes

## 2018-05-31 NOTE — TELEPHONE ENCOUNTER
----- Message from Yasemin Capellan sent at 5/31/2018  1:40 PM EDT -----  Contact: Patient  Patient would like a call back regarding her mammogram. Please advise    Patient:[phone]

## 2018-06-04 DIAGNOSIS — Z12.11 COLON CANCER SCREENING: Primary | ICD-10-CM

## 2018-06-04 RX ORDER — SODIUM CHLORIDE, SODIUM LACTATE, POTASSIUM CHLORIDE, CALCIUM CHLORIDE 600; 310; 30; 20 MG/100ML; MG/100ML; MG/100ML; MG/100ML
30 INJECTION, SOLUTION INTRAVENOUS CONTINUOUS
Status: CANCELLED | OUTPATIENT
Start: 2018-07-03

## 2018-06-05 PROBLEM — Z12.11 COLON CANCER SCREENING: Status: ACTIVE | Noted: 2018-06-05

## 2018-07-03 ENCOUNTER — ANESTHESIA (OUTPATIENT)
Dept: GASTROENTEROLOGY | Facility: HOSPITAL | Age: 63
End: 2018-07-03

## 2018-07-03 ENCOUNTER — HOSPITAL ENCOUNTER (OUTPATIENT)
Facility: HOSPITAL | Age: 63
Setting detail: HOSPITAL OUTPATIENT SURGERY
Discharge: HOME | End: 2018-07-03
Attending: INTERNAL MEDICINE | Admitting: INTERNAL MEDICINE

## 2018-07-03 ENCOUNTER — ANESTHESIA EVENT (OUTPATIENT)
Dept: GASTROENTEROLOGY | Facility: HOSPITAL | Age: 63
End: 2018-07-03

## 2018-07-03 VITALS
SYSTOLIC BLOOD PRESSURE: 110 MMHG | WEIGHT: 113.38 LBS | TEMPERATURE: 98 F | BODY MASS INDEX: 20.41 KG/M2 | RESPIRATION RATE: 16 BRPM | DIASTOLIC BLOOD PRESSURE: 70 MMHG | HEART RATE: 58 BPM | OXYGEN SATURATION: 96 %

## 2018-07-03 DIAGNOSIS — Z12.11 COLON CANCER SCREENING: ICD-10-CM

## 2018-07-03 PROCEDURE — S0260 H&P FOR SURGERY: HCPCS | Performed by: INTERNAL MEDICINE

## 2018-07-03 PROCEDURE — 88305 TISSUE EXAM BY PATHOLOGIST: CPT | Performed by: INTERNAL MEDICINE

## 2018-07-03 PROCEDURE — 25010000002 PROPOFOL 10 MG/ML EMULSION: Performed by: ANESTHESIOLOGY

## 2018-07-03 PROCEDURE — 45380 COLONOSCOPY AND BIOPSY: CPT | Performed by: INTERNAL MEDICINE

## 2018-07-03 RX ORDER — SODIUM CHLORIDE, SODIUM LACTATE, POTASSIUM CHLORIDE, CALCIUM CHLORIDE 600; 310; 30; 20 MG/100ML; MG/100ML; MG/100ML; MG/100ML
30 INJECTION, SOLUTION INTRAVENOUS CONTINUOUS
Status: DISCONTINUED | OUTPATIENT
Start: 2018-07-03 | End: 2018-07-03 | Stop reason: HOSPADM

## 2018-07-03 RX ORDER — PROPOFOL 10 MG/ML
VIAL (ML) INTRAVENOUS AS NEEDED
Status: DISCONTINUED | OUTPATIENT
Start: 2018-07-03 | End: 2018-07-03 | Stop reason: SURG

## 2018-07-03 RX ORDER — LIDOCAINE HYDROCHLORIDE 20 MG/ML
INJECTION, SOLUTION INFILTRATION; PERINEURAL AS NEEDED
Status: DISCONTINUED | OUTPATIENT
Start: 2018-07-03 | End: 2018-07-03 | Stop reason: SURG

## 2018-07-03 RX ADMIN — PROPOFOL 450 MG: 10 INJECTION, EMULSION INTRAVENOUS at 10:25

## 2018-07-03 RX ADMIN — SODIUM CHLORIDE, POTASSIUM CHLORIDE, SODIUM LACTATE AND CALCIUM CHLORIDE 30 ML/HR: 600; 310; 30; 20 INJECTION, SOLUTION INTRAVENOUS at 09:23

## 2018-07-03 RX ADMIN — SODIUM CHLORIDE, POTASSIUM CHLORIDE, SODIUM LACTATE AND CALCIUM CHLORIDE: 600; 310; 30; 20 INJECTION, SOLUTION INTRAVENOUS at 10:25

## 2018-07-03 RX ADMIN — LIDOCAINE HYDROCHLORIDE 100 MG: 20 INJECTION, SOLUTION INFILTRATION; PERINEURAL at 10:25

## 2018-07-03 NOTE — H&P
[facility]  Pre Procedure History & Physical    Chief Complaint: colon cancer screening      HPI: 61yo W with PMH as below here for her second screening colonoscopy.  No family history of GI malignancies nor colon polyps.  Denies blood in stool, change in bowel habits, abdominal pain.  Otherwise feels well.        Past Medical History:   Past Medical History:   Diagnosis Date   • Anxiety and depression    • Hyperlipidemia    • Osteoporosis        Family History:  Family History   Problem Relation Age of Onset   • Breast cancer Mother    • Heart attack Father         CABG at age 57 and again at age 70   • Coronary artery disease Paternal Uncle    • Coronary artery disease Paternal Grandmother    • Other Sister         pre-diabetes, thyroid problem   • Other Sister         pre-diabetes       Social History:   reports that she quit smoking about 9 years ago. Her smoking use included Cigarettes. She has a 3.00 pack-year smoking history. She has never used smokeless tobacco. She reports that she does not drink alcohol.    Medications:   Prescriptions Prior to Admission   Medication Sig Dispense Refill Last Dose   • alendronate (FOSAMAX) 70 MG tablet TAKE ONE TABLET BY MOUTH ONCE WEEKLY 12 tablet 2 Taking   • sertraline (ZOLOFT) 100 MG tablet TAKE ONE TABLET BY MOUTH DAILY 90 tablet 1 Taking   • traZODone (DESYREL) 50 MG tablet TAKE ONE-HALF TO ONE TABLET BY MOUTH EVERY NIGHT AT BEDTIME 90 tablet 1 Taking       Allergies:  Patient has no known allergies.    ROS:    Pertinent items are noted in HPI     Objective     There were no vitals taken for this visit.    Physical Exam   Constitutional: Pt is oriented to person, place, and time and well-developed, well-nourished, and in no distress.   HENT:   Mouth/Throat: Oropharynx is clear and moist.   Neck: Normal range of motion. Neck supple.   Cardiovascular: Normal rate, regular rhythm and normal heart sounds.    Pulmonary/Chest: Effort normal and  breath sounds normal. No respiratory distress. No  wheezes.   Abdominal: Soft. Bowel sounds are normal.   Skin: Skin is warm and dry.   Psychiatric: Mood, memory, affect and judgment normal.     Assessment/Plan     Diagnosis: colon cancer screening      Anticipated Surgical Procedure:    Colonoscopy    The risks, benefits, and alternatives of this procedure have been discussed with the patient or the responsible party- the patient understands and agrees to proceed.

## 2018-07-03 NOTE — ANESTHESIA POSTPROCEDURE EVALUATION
Patient: Stephanie Ferro    Procedure Summary     Date:  07/03/18 Room / Location:  [facility] /  [facility]    Anesthesia Start:  1024 Anesthesia Stop:  1102    Procedure:  COLONOSCOPY into cecum and ti with cold biopsy polypectomies (N/A ) Diagnosis:       Colon cancer screening      (Colon cancer screening [Z12.11])    Surgeon:  Asia Walters MD Provider:  Delmar Quijano MD    Anesthesia Type:  MAC ASA Status:  2          Anesthesia Type: MAC  Last vitals  BP   120/60 (07/03/18 0909)   Temp   36.7 °C (98 °F) (07/03/18 0909)   Pulse   54 (07/03/18 0909)   Resp   14 (07/03/18 0909)     SpO2   99 % (07/03/18 0909)     Post Anesthesia Care and Evaluation    Patient location during evaluation: bedside  Patient participation: complete - patient participated  Level of consciousness: awake and alert  Pain management: adequate  Airway patency: patent  Anesthetic complications: No anesthetic complications    Cardiovascular status: acceptable  Respiratory status: acceptable  Hydration status: acceptable    Comments: /60 (BP Location: Left arm, Patient Position: Lying)   Pulse 54   Temp 36.7 °C (98 °F) (Oral)   Resp 14   Wt 51.4 kg (113 lb 6 oz)   SpO2 99%   BMI 20.41 kg/m²

## 2018-07-03 NOTE — ANESTHESIA PREPROCEDURE EVALUATION
Anesthesia Evaluation     Patient summary reviewed and Nursing notes reviewed   NPO Solid Status: > 8 hours  NPO Liquid Status: > 4 hours           Airway   Mallampati: II  TM distance: >3 FB  Neck ROM: full  no difficulty expected  Dental - normal exam     Pulmonary - normal exam   Cardiovascular - normal exam    (+) hyperlipidemia,       Neuro/Psych  (+) psychiatric history Anxiety and Depression,     GI/Hepatic/Renal/Endo      Musculoskeletal     Abdominal  - normal exam   Substance History      OB/GYN          Other                        Anesthesia Plan    ASA 2     MAC     Anesthetic plan and risks discussed with patient.

## 2018-07-03 NOTE — DISCHARGE INSTRUCTIONS
For the next 24 hours patient needs to be with a responsible adult.    For 24 hours DO NOT drive, operate machinery, appliances, drink alcohol, make important decisions or sign legal documents.    Start with a light or bland diet and advance to regular diet as tolerated.    Follow recommendations on procedure report provided by your doctor.    Call Dr. TORRES for problems     Problems may include but not limited to: large amounts of bleeding, trouble breathing, repeated vomiting, severe unrelieved pain, fever or chills.

## 2018-07-05 LAB
CYTO UR: NORMAL
LAB AP CASE REPORT: NORMAL
Lab: NORMAL
PATH REPORT.FINAL DX SPEC: NORMAL
PATH REPORT.GROSS SPEC: NORMAL

## 2018-07-05 NOTE — PROGRESS NOTES
The polyps removed from her rectum were benign hyperplastic tissue.  In absence of symptoms, her next colonoscopy should be in 10 years.  Please place in recall.

## 2018-07-26 ENCOUNTER — OFFICE VISIT (OUTPATIENT)
Dept: INTERNAL MEDICINE | Facility: CLINIC | Age: 63
End: 2018-07-26

## 2018-07-26 VITALS
RESPIRATION RATE: 16 BRPM | SYSTOLIC BLOOD PRESSURE: 110 MMHG | BODY MASS INDEX: 20.16 KG/M2 | DIASTOLIC BLOOD PRESSURE: 64 MMHG | TEMPERATURE: 99.9 F | WEIGHT: 112 LBS | HEART RATE: 94 BPM | OXYGEN SATURATION: 94 %

## 2018-07-26 DIAGNOSIS — J06.9 ACUTE URI: Primary | ICD-10-CM

## 2018-07-26 PROCEDURE — 99213 OFFICE O/P EST LOW 20 MIN: CPT | Performed by: NURSE PRACTITIONER

## 2018-07-26 RX ORDER — LORATADINE 10 MG/1
1 CAPSULE, LIQUID FILLED ORAL DAILY
Qty: 7 EACH | Refills: 0
Start: 2018-07-26 | End: 2018-08-02

## 2018-07-26 RX ORDER — AMOXICILLIN 875 MG/1
875 TABLET, COATED ORAL 2 TIMES DAILY
Qty: 14 TABLET | Refills: 0 | Status: SHIPPED | OUTPATIENT
Start: 2018-07-26 | End: 2018-08-02

## 2018-07-26 NOTE — PATIENT INSTRUCTIONS
"Upper Respiratory Infection, Adult  Most upper respiratory infections (URIs) are a viral infection of the air passages leading to the lungs. A URI affects the nose, throat, and upper air passages. The most common type of URI is nasopharyngitis and is typically referred to as \"the common cold.\"  URIs run their course and usually go away on their own. Most of the time, a URI does not require medical attention, but sometimes a bacterial infection in the upper airways can follow a viral infection. This is called a secondary infection. Sinus and middle ear infections are common types of secondary upper respiratory infections.  Bacterial pneumonia can also complicate a URI. A URI can worsen asthma and chronic obstructive pulmonary disease (COPD). Sometimes, these complications can require emergency medical care and may be life threatening.  What are the causes?  Almost all URIs are caused by viruses. A virus is a type of germ and can spread from one person to another.  What increases the risk?  You may be at risk for a URI if:  · You smoke.  · You have chronic heart or lung disease.  · You have a weakened defense (immune) system.  · You are very young or very old.  · You have nasal allergies or asthma.  · You work in crowded or poorly ventilated areas.  · You work in health care facilities or schools.    What are the signs or symptoms?  Symptoms typically develop 2-3 days after you come in contact with a cold virus. Most viral URIs last 7-10 days. However, viral URIs from the influenza virus (flu virus) can last 14-18 days and are typically more severe. Symptoms may include:  · Runny or stuffy (congested) nose.  · Sneezing.  · Cough.  · Sore throat.  · Headache.  · Fatigue.  · Fever.  · Loss of appetite.  · Pain in your forehead, behind your eyes, and over your cheekbones (sinus pain).  · Muscle aches.    How is this diagnosed?  Your health care provider may diagnose a URI by:  · Physical exam.  · Tests to check that your " symptoms are not due to another condition such as:  ? Strep throat.  ? Sinusitis.  ? Pneumonia.  ? Asthma.    How is this treated?  A URI goes away on its own with time. It cannot be cured with medicines, but medicines may be prescribed or recommended to relieve symptoms. Medicines may help:  · Reduce your fever.  · Reduce your cough.  · Relieve nasal congestion.    Follow these instructions at home:  · Take medicines only as directed by your health care provider.  · Gargle warm saltwater or take cough drops to comfort your throat as directed by your health care provider.  · Use a warm mist humidifier or inhale steam from a shower to increase air moisture. This may make it easier to breathe.  · Drink enough fluid to keep your urine clear or pale yellow.  · Eat soups and other clear broths and maintain good nutrition.  · Rest as needed.  · Return to work when your temperature has returned to normal or as your health care provider advises. You may need to stay home longer to avoid infecting others. You can also use a face mask and careful hand washing to prevent spread of the virus.  · Increase the usage of your inhaler if you have asthma.  · Do not use any tobacco products, including cigarettes, chewing tobacco, or electronic cigarettes. If you need help quitting, ask your health care provider.  How is this prevented?  The best way to protect yourself from getting a cold is to practice good hygiene.  · Avoid oral or hand contact with people with cold symptoms.  · Wash your hands often if contact occurs.    There is no clear evidence that vitamin C, vitamin E, echinacea, or exercise reduces the chance of developing a cold. However, it is always recommended to get plenty of rest, exercise, and practice good nutrition.  Contact a health care provider if:  · You are getting worse rather than better.  · Your symptoms are not controlled by medicine.  · You have chills.  · You have worsening shortness of breath.  · You have  brown or red mucus.  · You have yellow or brown nasal discharge.  · You have pain in your face, especially when you bend forward.  · You have a fever.  · You have swollen neck glands.  · You have pain while swallowing.  · You have white areas in the back of your throat.  Get help right away if:  · You have severe or persistent:  ? Headache.  ? Ear pain.  ? Sinus pain.  ? Chest pain.  · You have chronic lung disease and any of the following:  ? Wheezing.  ? Prolonged cough.  ? Coughing up blood.  ? A change in your usual mucus.  · You have a stiff neck.  · You have changes in your:  ? Vision.  ? Hearing.  ? Thinking.  ? Mood.  This information is not intended to replace advice given to you by your health care provider. Make sure you discuss any questions you have with your health care provider.  Document Released: 06/13/2002 Document Revised: 08/20/2017 Document Reviewed: 03/25/2015  [employer] Interactive Patient Education © 2018 [employer].

## 2018-07-26 NOTE — PROGRESS NOTES
Subjective   Stephanie Ferro is a 62 y.o. female.     She recent returned from [place] on Sunday. Her spouse has been sick too.       URI    This is a new problem. The current episode started in the past 7 days. The problem has been gradually worsening. There has been no fever. Associated symptoms include congestion, coughing, headaches, rhinorrhea, sinus pain, sneezing and a sore throat. Pertinent negatives include no abdominal pain, chest pain, ear pain, nausea, plugged ear sensation, vomiting or wheezing. Treatments tried: mucinex, ibuprofen  The treatment provided mild relief.        The following portions of the patient's history were reviewed and updated as appropriate: allergies, current medications, past social history and problem list.    Review of Systems   HENT: Positive for congestion, postnasal drip, rhinorrhea, sinus pain, sinus pressure, sneezing and sore throat. Negative for ear pain.    Respiratory: Positive for cough. Negative for chest tightness, shortness of breath and wheezing.    Cardiovascular: Negative for chest pain.   Gastrointestinal: Negative for abdominal pain, nausea and vomiting.   Allergic/Immunologic: Positive for environmental allergies (minimal ).   Neurological: Positive for headaches.       Objective   Physical Exam   Constitutional: She appears well-developed and well-nourished. She is cooperative. She does not have a sickly appearance. She does not appear ill.   HENT:   Head: Normocephalic.   Right Ear: Hearing and external ear normal. No drainage, swelling or tenderness. No mastoid tenderness. Tympanic membrane is bulging. Tympanic membrane is not injected, not scarred and not erythematous. Tympanic membrane mobility is normal. No middle ear effusion. No decreased hearing is noted.   Left Ear: Hearing and external ear normal. No drainage, swelling or tenderness. No mastoid tenderness. Tympanic membrane is bulging. Tympanic membrane is not injected, not scarred and not  erythematous. Tympanic membrane mobility is normal.  No middle ear effusion. No decreased hearing is noted.   Nose: Mucosal edema and rhinorrhea present. No sinus tenderness or nasal deformity. Right sinus exhibits frontal sinus tenderness. Right sinus exhibits no maxillary sinus tenderness. Left sinus exhibits frontal sinus tenderness. Left sinus exhibits no maxillary sinus tenderness.   Mouth/Throat: Oropharynx is clear and moist and mucous membranes are normal. Normal dentition.   Eyes: Pupils are equal, round, and reactive to light. Conjunctivae and lids are normal. Right eye exhibits no discharge and no exudate. Left eye exhibits no discharge and no exudate.   Neck: Trachea normal and normal range of motion. No edema present. No thyroid mass and no thyromegaly present.   Cardiovascular: Regular rhythm, normal heart sounds and normal pulses.    No murmur heard.  Pulmonary/Chest: Breath sounds normal. No respiratory distress. She has no decreased breath sounds. She has no wheezes. She has no rhonchi. She has no rales.   Dry cough    Lymphadenopathy:        Head (right side): No submental, no submandibular, no tonsillar, no preauricular, no posterior auricular and no occipital adenopathy present.        Head (left side): No submental, no submandibular, no tonsillar, no preauricular, no posterior auricular and no occipital adenopathy present.     She has no cervical adenopathy.   Neurological: She is alert.   Skin: Skin is warm, dry and intact. No cyanosis. Nails show no clubbing.       Assessment/Plan   Stephanie was seen today for uri.    Diagnoses and all orders for this visit:    Acute URI  Comments:  continue with mucinex, drink plenty of water   Orders:  -     Loratadine (CLARITIN) 10 MG capsule; Take 1 tablet by mouth Daily for 7 days. Over the counter  -     amoxicillin (AMOXIL) 875 MG tablet; Take 1 tablet by mouth 2 (Two) Times a Day for 7 days.    She will return for worsening of symptoms.

## 2018-08-17 ENCOUNTER — TELEPHONE (OUTPATIENT)
Dept: GASTROENTEROLOGY | Facility: CLINIC | Age: 63
End: 2018-08-17

## 2018-08-20 NOTE — TELEPHONE ENCOUNTER
Pt called and advised per DR Walters that the polyps removed from her rectum were benign hyperplastic tissue.  In absence of symtpoms, her next c/s should be in 10 yrs. Pt verb understanding .

## 2018-09-19 ENCOUNTER — APPOINTMENT (OUTPATIENT)
Dept: WOMENS IMAGING | Facility: HOSPITAL | Age: 63
End: 2018-09-19

## 2018-09-19 ENCOUNTER — OFFICE VISIT (OUTPATIENT)
Dept: INTERNAL MEDICINE | Facility: CLINIC | Age: 63
End: 2018-09-19

## 2018-09-19 VITALS
DIASTOLIC BLOOD PRESSURE: 74 MMHG | OXYGEN SATURATION: 95 % | HEIGHT: 63 IN | WEIGHT: 113 LBS | HEART RATE: 73 BPM | SYSTOLIC BLOOD PRESSURE: 106 MMHG | BODY MASS INDEX: 20.02 KG/M2

## 2018-09-19 DIAGNOSIS — M65.311 TRIGGER FINGER OF RIGHT THUMB: ICD-10-CM

## 2018-09-19 DIAGNOSIS — M79.644 PAIN OF RIGHT THUMB: Primary | ICD-10-CM

## 2018-09-19 PROCEDURE — 73140 X-RAY EXAM OF FINGER(S): CPT | Performed by: NURSE PRACTITIONER

## 2018-09-19 PROCEDURE — 99213 OFFICE O/P EST LOW 20 MIN: CPT | Performed by: NURSE PRACTITIONER

## 2018-09-19 PROCEDURE — 73140 X-RAY EXAM OF FINGER(S): CPT | Performed by: RADIOLOGY

## 2018-09-19 RX ORDER — METHYLPREDNISOLONE 4 MG/1
TABLET ORAL
Qty: 21 TABLET | Refills: 0 | Status: SHIPPED | OUTPATIENT
Start: 2018-09-19 | End: 2018-11-12

## 2018-09-19 NOTE — PATIENT INSTRUCTIONS
Trigger Finger  Trigger finger (stenosing tenosynovitis) is a condition that causes a finger to get stuck in a bent position. Each finger has a tough, cord-like tissue that connects muscle to bone (tendon), and each tendon is surrounded by a tunnel of tissue (tendon sheath). To move your finger, your tendon needs to slide freely through the sheath. Trigger finger happens when the tendon or the sheath thickens, making it difficult to move your finger.  Trigger finger can affect any finger or a thumb. It may affect more than one finger. Mild cases may clear up with rest and medicine. Severe cases require more treatment.  What are the causes?  Trigger finger is caused by a thickened finger tendon or tendon sheath. The cause of this thickening is not known.  What increases the risk?  The following factors may make you more likely to develop this condition:  · Doing activities that require a strong .  · Having rheumatoid arthritis, gout, or diabetes.  · Being 40-60 years old.  · Being a woman.    What are the signs or symptoms?  Symptoms of this condition include:  · Pain when bending or straightening your finger.  · Tenderness or swelling where your finger attaches to the palm of your hand.  · A lump in the palm of your hand or on the inside of your finger.  · Hearing a popping sound when you try to straighten your finger.  · Feeling a popping, catching, or locking sensation when you try to straighten your finger.  · Being unable to straighten your finger.    How is this diagnosed?  This condition is diagnosed based on your symptoms and a physical exam.  How is this treated?  This condition may be treated by:  · Resting your finger and avoiding activities that make symptoms worse.  · Wearing a finger splint to keep your finger in a slightly bent position.  · Taking NSAIDs to relieve pain and swelling.  · Injecting medicine (steroids) into the tendon sheath to reduce swelling and irritation. Injections may need to be  repeated.  · Having surgery to open the tendon sheath. This may be done if other treatments do not work and you cannot straighten your finger. You may need physical therapy after surgery.    Follow these instructions at home:  · Use moist heat to help reduce pain and swelling as told by your health care provider.  · Rest your finger and avoid activities that make pain worse. Return to normal activities as told by your health care provider.  · If you have a splint, wear it as told by your health care provider.  · Take over-the-counter and prescription medicines only as told by your health care provider.  · Keep all follow-up visits as told by your health care provider. This is important.  Contact a health care provider if:  · Your symptoms are not improving with home care.  Summary  · Trigger finger (stenosing tenosynovitis) causes your finger to get stuck in a bent position, and it can make it difficult and painful to straighten your finger.  · This condition develops when a finger tendon or tendon sheath thickens.  · Treatment starts with resting, wearing a splint, and taking NSAIDs.  · In severe cases, surgery to open the tendon sheath may be needed.  This information is not intended to replace advice given to you by your health care provider. Make sure you discuss any questions you have with your health care provider.  Document Released: 10/07/2005 Document Revised: 11/28/2017 Document Reviewed: 11/28/2017  [employer] Interactive Patient Education © 2017 [employer]

## 2018-09-19 NOTE — PROGRESS NOTES
Subjective   Stephanie Ferro is a 62 y.o. female.     She reports she painted about 4 weeks ago and scrubbed the side of house and she reports it aggravated her right thumb pain. She is right hand dominant.       Hand Pain    The incident occurred more than 1 week ago. There was no injury mechanism. The pain is present in the right hand (right thumb ). Quality: sharp,  The pain does not radiate. The pain is at a severity of 6/10. The pain is moderate. The pain has been fluctuating since the incident. Pertinent negatives include no chest pain, numbness or tingling. Treatments tried: ibuprofen  The treatment provided mild relief.        The following portions of the patient's history were reviewed and updated as appropriate: allergies, current medications, past social history and problem list.    Review of Systems   Constitutional: Negative for activity change, appetite change, fatigue and fever.   Respiratory: Negative for cough, shortness of breath and wheezing.    Cardiovascular: Negative for chest pain, palpitations and leg swelling.   Musculoskeletal: Positive for arthralgias (right thumb ) and joint swelling (right thumb ).   Neurological: Negative for tingling and numbness.       Objective   Physical Exam   Constitutional: She is oriented to person, place, and time. She appears well-developed and well-nourished.   HENT:   Head: Normocephalic.   Nose: Nose normal.   Cardiovascular: Regular rhythm and normal heart sounds.  Exam reveals no S3 and no S4.    No murmur heard.  Pulmonary/Chest: Effort normal and breath sounds normal. She has no decreased breath sounds. She has no wheezes. She has no rhonchi. She has no rales.   Musculoskeletal: She exhibits no edema.        Right hand: She exhibits decreased range of motion, tenderness and swelling. Normal sensation noted.        Left hand: She exhibits normal range of motion, no tenderness, normal capillary refill, no deformity and no swelling. Normal sensation noted.    Difficulty with range of motion of right thumb  Popping noted with extension of right thumb, trace swelling noted    Neurological: She is alert and oriented to person, place, and time. Gait normal.   Skin: Skin is warm and dry.   Psychiatric: She has a normal mood and affect.       Assessment/Plan   Stephanie was seen today for hand pain.    Diagnoses and all orders for this visit:    Pain of right thumb  -     XR Finger 2+ View Right    Trigger finger of right thumb  -     Ambulatory Referral to Hand Surgery  -     MethylPREDNISolone (MEDROL) 4 MG tablet; follow package directions        Xray with mild degenerative changes. Sent for final review. I did recommend use of thumb splint.

## 2018-11-12 ENCOUNTER — OFFICE VISIT (OUTPATIENT)
Dept: INTERNAL MEDICINE | Facility: CLINIC | Age: 63
End: 2018-11-12

## 2018-11-12 VITALS
DIASTOLIC BLOOD PRESSURE: 56 MMHG | WEIGHT: 114 LBS | HEIGHT: 62 IN | BODY MASS INDEX: 20.98 KG/M2 | SYSTOLIC BLOOD PRESSURE: 94 MMHG

## 2018-11-12 DIAGNOSIS — Z23 NEED FOR INFLUENZA VACCINATION: ICD-10-CM

## 2018-11-12 DIAGNOSIS — Z11.59 SCREENING FOR VIRAL DISEASE: ICD-10-CM

## 2018-11-12 DIAGNOSIS — E78.5 HYPERLIPIDEMIA, UNSPECIFIED HYPERLIPIDEMIA TYPE: Primary | ICD-10-CM

## 2018-11-12 LAB
ALBUMIN SERPL-MCNC: 4.7 G/DL (ref 3.5–5.2)
ALBUMIN/GLOB SERPL: 1.6 G/DL
ALP SERPL-CCNC: 60 U/L (ref 39–117)
ALT SERPL W P-5'-P-CCNC: 20 U/L (ref 1–33)
ANION GAP SERPL CALCULATED.3IONS-SCNC: 8.9 MMOL/L
AST SERPL-CCNC: 17 U/L (ref 1–32)
BILIRUB SERPL-MCNC: 0.2 MG/DL (ref 0.1–1.2)
BUN BLD-MCNC: 11 MG/DL (ref 8–23)
BUN/CREAT SERPL: 12.4 (ref 7–25)
CALCIUM SPEC-SCNC: 9.9 MG/DL (ref 8.6–10.5)
CHLORIDE SERPL-SCNC: 101 MMOL/L (ref 98–107)
CHOLEST SERPL-MCNC: 276 MG/DL (ref 0–200)
CO2 SERPL-SCNC: 30.1 MMOL/L (ref 22–29)
CREAT BLD-MCNC: 0.89 MG/DL (ref 0.57–1)
GFR SERPL CREATININE-BSD FRML MDRD: 64 ML/MIN/1.73
GLOBULIN UR ELPH-MCNC: 2.9 GM/DL
GLUCOSE BLD-MCNC: 93 MG/DL (ref 65–99)
HCV AB SER DONR QL: NORMAL
HDLC SERPL-MCNC: 55 MG/DL (ref 40–60)
LDLC SERPL CALC-MCNC: 181 MG/DL (ref 0–100)
LDLC/HDLC SERPL: 3.3 {RATIO}
POTASSIUM BLD-SCNC: 4.1 MMOL/L (ref 3.5–5.2)
PROT SERPL-MCNC: 7.6 G/DL (ref 6–8.5)
SODIUM BLD-SCNC: 140 MMOL/L (ref 136–145)
TRIGL SERPL-MCNC: 198 MG/DL (ref 0–150)
VLDLC SERPL-MCNC: 39.6 MG/DL (ref 5–40)

## 2018-11-12 PROCEDURE — 99212 OFFICE O/P EST SF 10 MIN: CPT | Performed by: INTERNAL MEDICINE

## 2018-11-12 PROCEDURE — 80061 LIPID PANEL: CPT | Performed by: INTERNAL MEDICINE

## 2018-11-12 PROCEDURE — 90471 IMMUNIZATION ADMIN: CPT | Performed by: INTERNAL MEDICINE

## 2018-11-12 PROCEDURE — 80053 COMPREHEN METABOLIC PANEL: CPT | Performed by: INTERNAL MEDICINE

## 2018-11-12 PROCEDURE — 36415 COLL VENOUS BLD VENIPUNCTURE: CPT | Performed by: INTERNAL MEDICINE

## 2018-11-12 PROCEDURE — 90674 CCIIV4 VAC NO PRSV 0.5 ML IM: CPT | Performed by: INTERNAL MEDICINE

## 2018-11-12 PROCEDURE — 86803 HEPATITIS C AB TEST: CPT | Performed by: INTERNAL MEDICINE

## 2018-11-12 NOTE — PATIENT INSTRUCTIONS
Mediterranean Diet  A Mediterranean diet refers to food and lifestyle choices that are based on the traditions of countries located on the [place]. This way of eating has been shown to help prevent certain conditions and improve outcomes for people who have chronic diseases, like kidney disease and heart disease.  What are tips for following this plan?  Lifestyle  · Cook and eat meals together with your family, when possible.  · Drink enough fluid to keep your urine clear or pale yellow.  · Be physically active every day. This includes:  ? Aerobic exercise like running or swimming.  ? Leisure activities like gardening, walking, or housework.  · Get 7-8 hours of sleep each night.  · If recommended by your health care provider, drink red wine in moderation. This means 1 glass a day for nonpregnant women and 2 glasses a day for men. A glass of wine equals 5 oz (150 mL).  Reading food labels  · Check the serving size of packaged foods. For foods such as rice and pasta, the serving size refers to the amount of cooked product, not dry.  · Check the total fat in packaged foods. Avoid foods that have saturated fat or trans fats.  · Check the ingredients list for added sugars, such as corn syrup.  Shopping  · At the grocery store, buy most of your food from the areas near the walls of the store. This includes:  ? Fresh fruits and vegetables (produce).  ? Grains, beans, nuts, and seeds. Some of these may be available in unpackaged forms or large amounts (in bulk).  ? Fresh seafood.  ? Poultry and eggs.  ? Low-fat dairy products.  · Buy whole ingredients instead of prepackaged foods.  · Buy fresh fruits and vegetables in-season from local farmers markets.  · Buy frozen fruits and vegetables in resealable bags.  · If you do not have access to quality fresh seafood, buy precooked frozen shrimp or canned fish, such as tuna, salmon, or sardines.  · Buy small amounts of raw or cooked vegetables, salads, or olives from the  deli or salad bar at your store.  · Stock your pantry so you always have certain foods on hand, such as olive oil, canned tuna, canned tomatoes, rice, pasta, and beans.  Cooking  · Cook foods with extra-virgin olive oil instead of using butter or other vegetable oils.  · Have meat as a side dish, and have vegetables or grains as your main dish. This means having meat in small portions or adding small amounts of meat to foods like pasta or stew.  · Use beans or vegetables instead of meat in common dishes like chili or lasagna.  · Babbie with different cooking methods. Try roasting or broiling vegetables instead of steaming or sautéeing them.  · Add frozen vegetables to soups, stews, pasta, or rice.  · Add nuts or seeds for added healthy fat at each meal. You can add these to yogurt, salads, or vegetable dishes.  · Marinate fish or vegetables using olive oil, lemon juice, garlic, and fresh herbs.  Meal planning  · Plan to eat 1 vegetarian meal one day each week. Try to work up to 2 vegetarian meals, if possible.  · Eat seafood 2 or more times a week.  · Have healthy snacks readily available, such as:  ? Vegetable sticks with hummus.  ? Greek yogurt.  ? Fruit and nut trail mix.  · Eat balanced meals throughout the week. This includes:  ? Fruit: 2-3 servings a day  ? Vegetables: 4-5 servings a day  ? Low-fat dairy: 2 servings a day  ? Fish, poultry, or lean meat: 1 serving a day  ? Beans and legumes: 2 or more servings a week  ? Nuts and seeds: 1-2 servings a day  ? Whole grains: 6-8 servings a day  ? Extra-virgin olive oil: 3-4 servings a day  · Limit red meat and sweets to only a few servings a month  What are my food choices?  · Mediterranean diet  ? Recommended  ? Grains: Whole-grain pasta. Brown rice. Bulgar wheat. Polenta. Couscous. Whole-wheat bread. Oatmeal. Quinoa.  ? Vegetables: Artichokes. Beets. Broccoli. Cabbage. Carrots. Eggplant. Green beans. Chard. Kale. Spinach. Onions. Leeks. Peas. Squash.  Tomatoes. Peppers. Radishes.  ? Fruits: Apples. Apricots. Avocado. Berries. Bananas. Cherries. Dates. Figs. Grapes. Grace. Melon. Oranges. Peaches. Plums. Pomegranate.  ? Meats and other protein foods: Beans. Almonds. Sunflower seeds. Pine nuts. Peanuts. Cod. Eliot. Scallops. Shrimp. Tuna. Tilapia. Clams. Oysters. Eggs.  ? Dairy: Low-fat milk. Cheese. Greek yogurt.  ? Beverages: Water. Red wine. Herbal tea.  ? Fats and oils: Extra virgin olive oil. Avocado oil. Grape seed oil.  ? Sweets and desserts: Greek yogurt with honey. Baked apples. Poached pears. Trail mix.  ? Seasoning and other foods: Basil. Cilantro. Coriander. Cumin. Mint. Parsley. Gene. Rosemary. Tarragon. Garlic. Oregano. Thyme. Pepper. Balsalmic vinegar. Tahini. Hummus. Tomato sauce. Olives. Mushrooms.  ? Limit these  ? Grains: Prepackaged pasta or rice dishes. Prepackaged cereal with added sugar.  ? Vegetables: Deep fried potatoes (french fries).  ? Fruits: Fruit canned in syrup.  ? Meats and other protein foods: Beef. Pork. Lamb. Poultry with skin. Hot dogs. Garcia.  ? Dairy: Ice cream. Sour cream. Whole milk.  ? Beverages: Juice. Sugar-sweetened soft drinks. Beer. Liquor and spirits.  ? Fats and oils: Butter. Canola oil. Vegetable oil. Beef fat (tallow). Lard.  ? Sweets and desserts: Cookies. Cakes. Pies. Candy.  ? Seasoning and other foods: Mayonnaise. Premade sauces and marinades.  ? The items listed may not be a complete list. Talk with your dietitian about what dietary choices are right for you.  Summary  · The Mediterranean diet includes both food and lifestyle choices.  · Eat a variety of fresh fruits and vegetables, beans, nuts, seeds, and whole grains.  · Limit the amount of red meat and sweets that you eat.  · Talk with your health care provider about whether it is safe for you to drink red wine in moderation. This means 1 glass a day for nonpregnant women and 2 glasses a day for men. A glass of wine equals 5 oz (150 mL).  This information  is not intended to replace advice given to you by your health care provider. Make sure you discuss any questions you have with your health care provider.  Document Released: 08/10/2017 Document Revised: 09/12/2017 Document Reviewed: 08/10/2017  [employer] Interactive Patient Education © 2018 [employer]

## 2018-11-12 NOTE — PROGRESS NOTES
"Chief Complaint   Patient presents with   • Hyperlipidemia     6 month follow up       Subjective   Stephanie Ferro is a 63 y.o. female.     History of Present Illness     Hyperlipidemia:     Her most recent lipid panel was done on 5/10/2018.    Total cholesterol was 228, Triglycerides 151, HDL 50, .   Current treatment: dietary modification, exercise.  By report, there is fairly good compliance with treatment, good tolerance of treatment. She admits she could do better with diet and exercise.          The following portions of the patient's history were reviewed and updated as appropriate: allergies, current medications, past family history, past medical history, past social history, past surgical history and problem list.    Review of Systems   Constitutional: Negative for appetite change.   HENT: Negative for nosebleeds.    Eyes: Negative for blurred vision and double vision.   Respiratory: Negative for cough and shortness of breath.    Cardiovascular: Negative for chest pain, palpitations and leg swelling.         Current Outpatient Medications:   •  alendronate (FOSAMAX) 70 MG tablet, TAKE ONE TABLET BY MOUTH ONCE WEEKLY, Disp: 12 tablet, Rfl: 2  •  sertraline (ZOLOFT) 100 MG tablet, TAKE ONE TABLET BY MOUTH DAILY, Disp: 90 tablet, Rfl: 1  •  traZODone (DESYREL) 50 MG tablet, TAKE ONE-HALF TO ONE TABLET BY MOUTH EVERY NIGHT AT BEDTIME, Disp: 90 tablet, Rfl: 1        Objective     BP 94/56 (BP Location: Right arm, Patient Position: Sitting, Cuff Size: Adult)   Ht 156.8 cm (61.75\")   Wt 51.7 kg (114 lb)   BMI 21.02 kg/m²     Physical Exam   Constitutional: She is oriented to person, place, and time. She appears well-developed and well-nourished. No distress.   Neck: Normal carotid pulses present. Carotid bruit is not present.   Cardiovascular: Regular rhythm, S1 normal and S2 normal. Exam reveals no gallop and no friction rub.   No murmur heard.  Pulses:       Carotid pulses are 2+ on the right side, and " 2+ on the left side.  Pulmonary/Chest: Effort normal and breath sounds normal. She has no wheezes. She has no rhonchi. She has no rales. Chest wall is not dull to percussion.   Musculoskeletal: She exhibits no edema.   Neurological: She is alert and oriented to person, place, and time.   Skin: Skin is warm and dry.   Nursing note and vitals reviewed.        Assessment/Plan   Stephanie was seen today for hyperlipidemia.    Diagnoses and all orders for this visit:    Hyperlipidemia, unspecified hyperlipidemia type  -     Comprehensive Metabolic Panel; Future  -     Lipid Panel; Future    Need for influenza vaccination  -     Flucelvax Quad=>4Years (PFS)    Screening for viral disease  -     Hepatitis C Antibody; Future

## 2018-11-27 RX ORDER — SERTRALINE HYDROCHLORIDE 100 MG/1
TABLET, FILM COATED ORAL
Qty: 90 TABLET | Refills: 1 | Status: SHIPPED | OUTPATIENT
Start: 2018-11-27 | End: 2019-06-24 | Stop reason: SDUPTHER

## 2018-12-17 RX ORDER — ALENDRONATE SODIUM 70 MG/1
TABLET ORAL
Qty: 12 TABLET | Refills: 1 | Status: SHIPPED | OUTPATIENT
Start: 2018-12-17 | End: 2019-08-14 | Stop reason: SDUPTHER

## 2019-01-07 RX ORDER — TRAZODONE HYDROCHLORIDE 50 MG/1
TABLET ORAL
Qty: 90 TABLET | Refills: 1 | Status: SHIPPED | OUTPATIENT
Start: 2019-01-07 | End: 2019-11-26 | Stop reason: SDUPTHER

## 2019-04-23 ENCOUNTER — OFFICE VISIT (OUTPATIENT)
Dept: INTERNAL MEDICINE | Facility: CLINIC | Age: 64
End: 2019-04-23

## 2019-04-23 VITALS
WEIGHT: 113 LBS | HEIGHT: 62 IN | BODY MASS INDEX: 20.8 KG/M2 | SYSTOLIC BLOOD PRESSURE: 112 MMHG | DIASTOLIC BLOOD PRESSURE: 64 MMHG | TEMPERATURE: 98.1 F

## 2019-04-23 DIAGNOSIS — R05.9 COUGH: Primary | ICD-10-CM

## 2019-04-23 DIAGNOSIS — J30.2 SEASONAL ALLERGIC RHINITIS, UNSPECIFIED TRIGGER: ICD-10-CM

## 2019-04-23 PROCEDURE — 99213 OFFICE O/P EST LOW 20 MIN: CPT | Performed by: NURSE PRACTITIONER

## 2019-04-23 RX ORDER — BENZONATATE 200 MG/1
200 CAPSULE ORAL 3 TIMES DAILY PRN
Qty: 30 CAPSULE | Refills: 0 | Status: SHIPPED | OUTPATIENT
Start: 2019-04-23 | End: 2019-10-03

## 2019-04-23 NOTE — PROGRESS NOTES
Subjective   Stephanie Ferro is a 63 y.o. female.     She presents with cough, sore throat x 3 days. Her  has had bronchitis. Traveled to [place] 2 wks ago.      Cough   This is a new problem. The current episode started in the past 7 days. The problem has been gradually worsening. The cough is productive of sputum (green, yellow mucus). Associated symptoms include headaches, postnasal drip, rhinorrhea, a sore throat and wheezing. Pertinent negatives include no chest pain, chills, ear pain, fever, heartburn, nasal congestion, shortness of breath or sweats. Nothing aggravates the symptoms. Treatments tried: mucinex. The treatment provided moderate relief. Her past medical history is significant for environmental allergies. There is no history of asthma, COPD or pneumonia.        The following portions of the patient's history were reviewed and updated as appropriate: allergies, current medications, past family history, past medical history, past social history, past surgical history and problem list.    Review of Systems   Constitutional: Positive for fatigue. Negative for chills and fever.   HENT: Positive for postnasal drip, rhinorrhea and sore throat. Negative for congestion and ear pain.    Respiratory: Positive for cough and wheezing. Negative for chest tightness and shortness of breath.    Cardiovascular: Negative for chest pain.   Gastrointestinal: Negative for heartburn.   Allergic/Immunologic: Positive for environmental allergies.   Neurological: Positive for headaches.       Objective   Physical Exam   Constitutional: She appears well-developed and well-nourished. No distress.   HENT:   Head: Normocephalic and atraumatic.   Right Ear: Hearing and tympanic membrane normal.   Left Ear: Hearing and tympanic membrane normal.   Nose: Mucosal edema and rhinorrhea present. Right sinus exhibits no maxillary sinus tenderness and no frontal sinus tenderness. Left sinus exhibits no maxillary sinus tenderness and  no frontal sinus tenderness.   Mouth/Throat: Uvula is midline and mucous membranes are normal. Posterior oropharyngeal erythema (clear hypersecretions noted) present. No oropharyngeal exudate. No tonsillar exudate.   Eyes: Conjunctivae are normal. Right eye exhibits no discharge. Left eye exhibits no discharge.   Cardiovascular: Normal rate, regular rhythm and normal heart sounds.   No murmur heard.  Pulmonary/Chest: Effort normal and breath sounds normal. No tachypnea. She has no wheezes.   Lymphadenopathy:     She has no cervical adenopathy.   Neurological: She is alert.   Skin: She is not diaphoretic.   Psychiatric: She has a normal mood and affect.   Vitals reviewed.      Assessment/Plan   Stephanie was seen today for cough.    Diagnoses and all orders for this visit:    Cough  -     benzonatate (TESSALON) 200 MG capsule; Take 1 capsule by mouth 3 (Three) Times a Day As Needed for Cough.    Seasonal allergic rhinitis, unspecified trigger     Take Mucinex, allergy antihistamine, fluticasone nasal spray (all OTC) as directed. Fluticasone nasal spray OTC, 2 sprays each nostril daily throughout illness. Continue taking everyday to prevent further illnesses. Increase fluid intake and rest. Return for worsening of sx.

## 2019-06-24 RX ORDER — SERTRALINE HYDROCHLORIDE 100 MG/1
TABLET, FILM COATED ORAL
Qty: 90 TABLET | Refills: 0 | Status: SHIPPED | OUTPATIENT
Start: 2019-06-24 | End: 2019-10-12 | Stop reason: SDUPTHER

## 2019-08-15 RX ORDER — ALENDRONATE SODIUM 70 MG/1
TABLET ORAL
Qty: 12 TABLET | Refills: 0 | Status: SHIPPED | OUTPATIENT
Start: 2019-08-15 | End: 2019-12-16 | Stop reason: SDUPTHER

## 2019-10-03 ENCOUNTER — OFFICE VISIT (OUTPATIENT)
Dept: INTERNAL MEDICINE | Facility: CLINIC | Age: 64
End: 2019-10-03

## 2019-10-03 ENCOUNTER — APPOINTMENT (OUTPATIENT)
Dept: WOMENS IMAGING | Facility: HOSPITAL | Age: 64
End: 2019-10-03

## 2019-10-03 VITALS
DIASTOLIC BLOOD PRESSURE: 70 MMHG | WEIGHT: 111 LBS | HEIGHT: 62 IN | SYSTOLIC BLOOD PRESSURE: 110 MMHG | BODY MASS INDEX: 20.43 KG/M2

## 2019-10-03 DIAGNOSIS — Z12.31 ENCOUNTER FOR SCREENING MAMMOGRAM FOR BREAST CANCER: ICD-10-CM

## 2019-10-03 DIAGNOSIS — Z23 NEED FOR INFLUENZA VACCINATION: ICD-10-CM

## 2019-10-03 DIAGNOSIS — Z00.00 ANNUAL PHYSICAL EXAM: Primary | ICD-10-CM

## 2019-10-03 DIAGNOSIS — Z12.11 ENCOUNTER FOR HEMOCCULT SCREENING: ICD-10-CM

## 2019-10-03 DIAGNOSIS — Z12.31 ENCOUNTER FOR SCREENING MAMMOGRAM FOR BREAST CANCER: Primary | ICD-10-CM

## 2019-10-03 DIAGNOSIS — Z12.4 PAP SMEAR FOR CERVICAL CANCER SCREENING: ICD-10-CM

## 2019-10-03 LAB
ALBUMIN SERPL-MCNC: 5.3 G/DL (ref 3.5–5.2)
ALBUMIN/GLOB SERPL: 2 G/DL
ALP SERPL-CCNC: 66 U/L (ref 39–117)
ALT SERPL W P-5'-P-CCNC: 12 U/L (ref 1–33)
ANION GAP SERPL CALCULATED.3IONS-SCNC: 11.3 MMOL/L (ref 5–15)
AST SERPL-CCNC: 14 U/L (ref 1–32)
BILIRUB SERPL-MCNC: 0.3 MG/DL (ref 0.2–1.2)
BILIRUB UR QL STRIP: NEGATIVE
BUN BLD-MCNC: 15 MG/DL (ref 8–23)
BUN/CREAT SERPL: 19.7 (ref 7–25)
CALCIUM SPEC-SCNC: 9.5 MG/DL (ref 8.6–10.5)
CHLORIDE SERPL-SCNC: 98 MMOL/L (ref 98–107)
CHOLEST SERPL-MCNC: 231 MG/DL (ref 0–200)
CLARITY UR: CLEAR
CO2 SERPL-SCNC: 30.7 MMOL/L (ref 22–29)
COLOR UR: YELLOW
CREAT BLD-MCNC: 0.76 MG/DL (ref 0.57–1)
DEPRECATED RDW RBC AUTO: 38.8 FL (ref 37–54)
ERYTHROCYTE [DISTWIDTH] IN BLOOD BY AUTOMATED COUNT: 11.8 % (ref 12.3–15.4)
GFR SERPL CREATININE-BSD FRML MDRD: 77 ML/MIN/1.73
GLOBULIN UR ELPH-MCNC: 2.7 GM/DL
GLUCOSE BLD-MCNC: 89 MG/DL (ref 65–99)
GLUCOSE UR STRIP-MCNC: NEGATIVE MG/DL
HCT VFR BLD AUTO: 43.1 % (ref 34–46.6)
HDLC SERPL-MCNC: 59 MG/DL (ref 40–60)
HEMOCCULT STL QL IA: NEGATIVE
HGB BLD-MCNC: 14.1 G/DL (ref 12–15.9)
HGB UR QL STRIP.AUTO: NEGATIVE
KETONES UR QL STRIP: NEGATIVE
LDLC SERPL CALC-MCNC: 158 MG/DL (ref 0–100)
LDLC/HDLC SERPL: 2.68 {RATIO}
LEUKOCYTE ESTERASE UR QL STRIP.AUTO: NEGATIVE
MCH RBC QN AUTO: 30 PG (ref 26.6–33)
MCHC RBC AUTO-ENTMCNC: 32.7 G/DL (ref 31.5–35.7)
MCV RBC AUTO: 91.7 FL (ref 79–97)
NITRITE UR QL STRIP: NEGATIVE
PH UR STRIP.AUTO: 7 [PH] (ref 5–8)
PLATELET # BLD AUTO: 193 10*3/MM3 (ref 140–450)
PMV BLD AUTO: 9.7 FL (ref 6–12)
POTASSIUM BLD-SCNC: 3.6 MMOL/L (ref 3.5–5.2)
PROT SERPL-MCNC: 8 G/DL (ref 6–8.5)
PROT UR QL STRIP: NEGATIVE
RBC # BLD AUTO: 4.7 10*6/MM3 (ref 3.77–5.28)
SODIUM BLD-SCNC: 140 MMOL/L (ref 136–145)
SP GR UR STRIP: 1.01 (ref 1–1.03)
TRIGL SERPL-MCNC: 69 MG/DL (ref 0–150)
TSH SERPL-ACNC: 2.69 UIU/ML (ref 0.27–4.2)
UROBILINOGEN UR QL STRIP: NORMAL
VLDLC SERPL-MCNC: 13.8 MG/DL (ref 5–40)
WBC NRBC COR # BLD: 5.55 10*3/MM3 (ref 3.4–10.8)

## 2019-10-03 PROCEDURE — 90674 CCIIV4 VAC NO PRSV 0.5 ML IM: CPT | Performed by: INTERNAL MEDICINE

## 2019-10-03 PROCEDURE — 99396 PREV VISIT EST AGE 40-64: CPT | Performed by: INTERNAL MEDICINE

## 2019-10-03 PROCEDURE — 82274 ASSAY TEST FOR BLOOD FECAL: CPT | Performed by: INTERNAL MEDICINE

## 2019-10-03 PROCEDURE — 85027 COMPLETE CBC AUTOMATED: CPT | Performed by: INTERNAL MEDICINE

## 2019-10-03 PROCEDURE — 80053 COMPREHEN METABOLIC PANEL: CPT | Performed by: INTERNAL MEDICINE

## 2019-10-03 PROCEDURE — 77067 SCR MAMMO BI INCL CAD: CPT | Performed by: RADIOLOGY

## 2019-10-03 PROCEDURE — 80061 LIPID PANEL: CPT | Performed by: INTERNAL MEDICINE

## 2019-10-03 PROCEDURE — 81003 URINALYSIS AUTO W/O SCOPE: CPT | Performed by: INTERNAL MEDICINE

## 2019-10-03 PROCEDURE — 90471 IMMUNIZATION ADMIN: CPT | Performed by: INTERNAL MEDICINE

## 2019-10-03 PROCEDURE — 77067 SCR MAMMO BI INCL CAD: CPT | Performed by: INTERNAL MEDICINE

## 2019-10-03 NOTE — PROGRESS NOTES
Chief Complaint   Patient presents with   • Annual Exam       Subjective   Stephanie Ferro is a 63 y.o. female.     History of Present Illness     She is here for annual examination.    She generally feels healthy. Her appetite is good.  Diet is prudent.  She is sleeping well (does wake up early), energy is good. She exercises regularly.  She is up to eye exams, not dental exams.       The following portions of the patient's history were reviewed and updated as appropriate: allergies, current medications, past family history, past medical history, past social history, past surgical history and problem list.    Review of Systems   Constitutional: Negative for appetite change, chills, fatigue, fever, unexpected weight gain and unexpected weight loss.   HENT: Negative for congestion, sinus pressure, sore throat, tinnitus, trouble swallowing and voice change.    Eyes: Negative for blurred vision and double vision.   Respiratory: Negative for cough and shortness of breath.    Cardiovascular: Negative for chest pain, palpitations and leg swelling.   Gastrointestinal: Negative for abdominal pain, constipation, diarrhea, nausea, vomiting and indigestion.   Endocrine: Negative for cold intolerance, heat intolerance, polydipsia and polyuria.   Genitourinary: Negative for breast discharge, breast lump, dysuria and frequency.   Musculoskeletal: Negative for arthralgias and back pain.   Skin: Negative for rash.   Neurological: Negative for dizziness.   Hematological: Negative for adenopathy. Does not bruise/bleed easily.   Psychiatric/Behavioral: Negative for sleep disturbance and depressed mood. The patient is not nervous/anxious.          Current Outpatient Medications:   •  alendronate (FOSAMAX) 70 MG tablet, TAKE ONE TABLET BY MOUTH ONCE WEEKLY, Disp: 12 tablet, Rfl: 0  •  sertraline (ZOLOFT) 100 MG tablet, TAKE ONE TABLET BY MOUTH DAILY, Disp: 90 tablet, Rfl: 0  •  traZODone (DESYREL) 50 MG tablet, TAKE ONE-HALF TO ONE TABLET  "BY MOUTH EVERY NIGHT AT BEDTIME, Disp: 90 tablet, Rfl: 1        Objective     /70 (BP Location: Left arm, Patient Position: Sitting, Cuff Size: Adult)   Ht 158.1 cm (62.25\")   Wt 50.3 kg (111 lb)   BMI 20.14 kg/m²     Physical Exam   Constitutional: She is oriented to person, place, and time. She appears well-developed and well-nourished. No distress.   HENT:   Right Ear: Tympanic membrane and ear canal normal.   Left Ear: Tympanic membrane and ear canal normal.   Nose: Right sinus exhibits no maxillary sinus tenderness and no frontal sinus tenderness. Left sinus exhibits no maxillary sinus tenderness and no frontal sinus tenderness.   Mouth/Throat: Oropharynx is clear and moist.   Eyes: Conjunctivae and EOM are normal. Pupils are equal, round, and reactive to light. No scleral icterus.   Neck: Normal range of motion. Neck supple. Normal carotid pulses present. Carotid bruit is not present. No tracheal deviation present. No thyromegaly present.   Cardiovascular: Regular rhythm, S1 normal, S2 normal and intact distal pulses. Exam reveals no gallop and no friction rub.   No murmur heard.  Pulses:       Carotid pulses are 2+ on the right side, and 2+ on the left side.  Pulmonary/Chest: Effort normal and breath sounds normal. She has no wheezes. She has no rhonchi. She has no rales. Chest wall is not dull to percussion. Right breast exhibits no inverted nipple, no mass, no nipple discharge, no skin change and no tenderness. Left breast exhibits no inverted nipple, no mass, no nipple discharge, no skin change and no tenderness.   Abdominal: Soft. Normal appearance and bowel sounds are normal. She exhibits no abdominal bruit. There is no hepatosplenomegaly. There is no tenderness. There is no rebound and no guarding.   Genitourinary: Rectum normal, vagina normal and uterus normal. Rectal exam shows guaiac negative stool. No labial fusion. There is no rash, tenderness, lesion, injury or Bartholin's cyst on the " right labia. There is no rash, tenderness, lesion, injury or Bartholin's cyst on the left labia. Right adnexum displays no mass, no tenderness and no fullness. Left adnexum displays no mass, no tenderness and no fullness.   Genitourinary Comments: Discomfort present on opening speculum.  Cervix not well visualized.   Musculoskeletal: She exhibits no edema.   Lymphadenopathy:     She has no cervical adenopathy.   Neurological: She is alert and oriented to person, place, and time. No cranial nerve deficit. Coordination normal.   Skin: Skin is warm and dry.   Psychiatric: She has a normal mood and affect.   Nursing note and vitals reviewed.        Assessment/Plan   Stephanie was seen today for annual exam.    Diagnoses and all orders for this visit:    Annual physical exam  -     Comprehensive Metabolic Panel; Future  -     CBC (No Diff); Future  -     TSH Rfx On Abnormal To Free T4; Future  -     Lipid Panel; Future  -     Urinalysis With Microscopic If Indicated (No Culture) - Urine, Clean Catch; Future    Pap smear for cervical cancer screening  -     Pap IG, HPV-hr; Future    Encounter for Hemoccult screening  -     POC FECAL OCCULT BLOOD BY IMMUNOASSAY    Need for influenza vaccination  -     Flucelvax Quad=>4Years (PFS)    Encouraged prudent diet, regular exercise, maintenance of healthy weight, good sleep habits,  avoidance of tobacco products, excessive alcohol.  Recommended dental exam.

## 2019-10-08 LAB
CHROM ANALY OVERALL INTERP-IMP: NORMAL
CONV .: NORMAL
CONV PERFORMED BY:: NORMAL
DX ICD CODE: NORMAL
HIV 1 & 2 AB SER-IMP: NORMAL
HPV I/H RISK 1 DNA CVX QL PROBE+SIG AMP: NEGATIVE
REF LAB TEST METHOD: NORMAL
STAT OF ADQ CVX/VAG CYTO-IMP: NORMAL

## 2019-10-14 RX ORDER — SERTRALINE HYDROCHLORIDE 100 MG/1
TABLET, FILM COATED ORAL
Qty: 90 TABLET | Refills: 1 | Status: SHIPPED | OUTPATIENT
Start: 2019-10-14 | End: 2020-03-27

## 2019-11-26 RX ORDER — TRAZODONE HYDROCHLORIDE 50 MG/1
TABLET ORAL
Qty: 90 TABLET | Refills: 1 | Status: SHIPPED | OUTPATIENT
Start: 2019-11-26 | End: 2020-10-01

## 2019-12-17 RX ORDER — ALENDRONATE SODIUM 70 MG/1
TABLET ORAL
Qty: 12 TABLET | Refills: 0 | Status: SHIPPED | OUTPATIENT
Start: 2019-12-17 | End: 2020-03-27

## 2020-03-27 RX ORDER — SERTRALINE HYDROCHLORIDE 100 MG/1
TABLET, FILM COATED ORAL
Qty: 90 TABLET | Refills: 0 | Status: SHIPPED | OUTPATIENT
Start: 2020-03-27 | End: 2020-09-11

## 2020-03-27 RX ORDER — ALENDRONATE SODIUM 70 MG/1
TABLET ORAL
Qty: 12 TABLET | Refills: 0 | Status: SHIPPED | OUTPATIENT
Start: 2020-03-27 | End: 2020-07-08

## 2020-07-08 RX ORDER — ALENDRONATE SODIUM 70 MG/1
TABLET ORAL
Qty: 12 TABLET | Refills: 0 | Status: SHIPPED | OUTPATIENT
Start: 2020-07-08 | End: 2020-10-20 | Stop reason: SDUPTHER

## 2020-08-11 ENCOUNTER — OFFICE VISIT (OUTPATIENT)
Dept: INTERNAL MEDICINE | Facility: CLINIC | Age: 65
End: 2020-08-11

## 2020-08-11 VITALS
HEART RATE: 72 BPM | SYSTOLIC BLOOD PRESSURE: 101 MMHG | HEIGHT: 62 IN | WEIGHT: 114 LBS | OXYGEN SATURATION: 96 % | BODY MASS INDEX: 20.98 KG/M2 | RESPIRATION RATE: 16 BRPM | TEMPERATURE: 99.5 F | DIASTOLIC BLOOD PRESSURE: 66 MMHG

## 2020-08-11 DIAGNOSIS — L24.89 IRRITANT CONTACT DERMATITIS DUE TO OTHER AGENTS: Primary | ICD-10-CM

## 2020-08-11 DIAGNOSIS — L30.9 DERMATITIS: ICD-10-CM

## 2020-08-11 PROCEDURE — 99213 OFFICE O/P EST LOW 20 MIN: CPT | Performed by: NURSE PRACTITIONER

## 2020-08-11 RX ORDER — BETAMETHASONE DIPROPIONATE 0.5 MG/G
CREAM TOPICAL 2 TIMES DAILY
Qty: 15 G | Refills: 0 | Status: SHIPPED | OUTPATIENT
Start: 2020-08-11 | End: 2020-10-19

## 2020-08-11 RX ORDER — METHYLPREDNISOLONE 4 MG/1
TABLET ORAL
Qty: 21 TABLET | Refills: 0 | Status: SHIPPED | OUTPATIENT
Start: 2020-08-11 | End: 2020-10-19

## 2020-08-11 NOTE — PATIENT INSTRUCTIONS
Poison Ivy  If you are going into areas that may have poison ivy, prepare with a product like IvyX or other ivy blocker.  Wash your clothes and pets after being in an area with ivy when returning home. If you come in contact with poison ivy, try a product like Technu     Calamine lotion as directed on package.       Poison Ivy Dermatitis  Poison ivy dermatitis is inflammation of the skin that is caused by chemicals in the leaves of the poison ivy plant. The skin reaction often involves redness, swelling, blisters, and extreme itching.  What are the causes?  This condition is caused by a chemical (urushiol) found in the sap of the poison ivy plant. This chemical is sticky and can be easily spread to people, animals, and objects. You can get poison ivy dermatitis by:  · Having direct contact with a poison ivy plant.  · Touching animals, other people, or objects that have come in contact with poison ivy and have the chemical on them.  What increases the risk?  This condition is more likely to develop in people who:  · Are outdoors often in wooded or marshy areas.  · Go outdoors without wearing protective clothing, such as closed shoes, long pants, and a long-sleeved shirt.  What are the signs or symptoms?  Symptoms of this condition include:  · Redness of the skin.  · Extreme itching.  · A rash that often includes bumps and blisters. The rash usually appears 48 hours after exposure, if you have been exposed before. If this is the first time you have been exposed, the rash may not appear until a week after exposure.  · Swelling. This may occur if the reaction is more severe.  Symptoms usually last for 1-2 weeks. However, the first time you develop this condition, symptoms may last 3-4 weeks.  How is this diagnosed?  This condition may be diagnosed based on your symptoms and a physical exam. Your health care provider may also ask you about any recent outdoor activity.  How is this treated?  Treatment for this condition  will vary depending on how severe it is. Treatment may include:  · Hydrocortisone cream or calamine lotion to relieve itching.  · Oatmeal baths to soothe the skin.  · Medicines, such as over-the-counter antihistamine tablets.  · Oral steroid medicine, for more severe reactions.  Follow these instructions at home:  Medicines  · Take or apply over-the-counter and prescription medicines only as told by your health care provider.  · Use hydrocortisone cream or calamine lotion as needed to soothe the skin and relieve itching.  General instructions  · Do not scratch or rub your skin.  · Apply a cold, wet cloth (cold compress) to the affected areas or take baths in cool water. This will help with itching. Avoid hot baths and showers.  · Take oatmeal baths as needed. Use colloidal oatmeal. You can get this at your local pharmacy or grocery store. Follow the instructions on the packaging.  · While you have the rash, wash clothes right after you wear them.  · Keep all follow-up visits as told by your health care provider. This is important.  How is this prevented?    · Learn to identify the poison ivy plant and avoid contact with the plant. This plant can be recognized by the number of leaves. Generally, poison ivy has three leaves with flowering branches on a single stem. The leaves are typically glossy, and they have jagged edges that come to a point at the front.  · If you have been exposed to poison ivy, thoroughly wash with soap and water right away. You have about 30 minutes to remove the plant resin before it will cause the rash. Be sure to wash under your fingernails, because any plant resin there will continue to spread the rash.  · When hiking or camping, wear clothes that will help you to avoid exposure on the skin. This includes long pants, a long-sleeved shirt, tall socks, and hiking boots. You can also apply preventive lotion to your skin to help limit exposure.  · If you suspect that your clothes or outdoor  gear came in contact with poison ivy, rinse them off outside with a garden hose before you bring them inside your house.  · When doing yard work or gardening, wear gloves, long sleeves, long pants, and boots. Wash your garden tools and gloves if they come in contact with poison ivy.  · If you suspect that your pet has come into contact with poison ivy, wash him or her with pet shampoo and water. Make sure to wear gloves while washing your pet.  Contact a health care provider if you have:  · Open sores in the rash area.  · More redness, swelling, or pain in the affected area.  · Redness that spreads beyond the rash area.  · Fluid, blood, or pus coming from the affected area.  · A fever.  · A rash over a large area of your body.  · A rash on your eyes, mouth, or genitals.  · A rash that does not improve after a few weeks.  Get help right away if:  · Your face swells or your eyes swell shut.  · You have trouble breathing.  · You have trouble swallowing.  These symptoms may represent a serious problem that is an emergency. Do not wait to see if the symptoms will go away. Get medical help right away. Call your local emergency services (911 in the [place] Do not drive yourself to the hospital.  Summary  · Poison ivy dermatitis is inflammation of the skin that is caused by chemicals in the leaves of the poison ivy plant.  · Symptoms of this condition include redness, itching, a rash, and swelling.  · Do not scratch or rub your skin.  · Take or apply over-the-counter and prescription medicines only as told by your health care provider.  This information is not intended to replace advice given to you by your health care provider. Make sure you discuss any questions you have with your health care provider.  Document Released: 12/15/2001 Document Revised: 04/10/2020 Document Reviewed: 12/13/2019  Elsevier Patient Education © 2020 Elsevier Inc.

## 2020-08-11 NOTE — PROGRESS NOTES
Name: Stephanie Ferro  :  1955    Subjective:      Chief Complaint   Patient presents with   • Poison Ivy     Pt presents here today with possible poison malik.        Stephanie Ferro is a 64 y.o. female patient of Stephanie Condon MD who is here for poison ivy exposure.  She is new to me.      She states she was clearing out honey suckle off of a fence and thinks the neighbour's side.   Friday exposure and broke out on Saturday.  She has had it before and states she has to take steroids in the past to get past it.   No soa, fever, chills.   Itches most of the time.   She has used benadryl.     No other changes in detergents or soaps.     I have reviewed the patient's medical history in detail and updated the computerized patient record.    Past Medical History:   Diagnosis Date   • Anxiety and depression    • Hyperlipidemia    • Osteoporosis        Past Surgical History:   Procedure Laterality Date   • COLONOSCOPY     • COLONOSCOPY N/A 7/3/2018    Procedure: COLONOSCOPY into cecum and ti with cold biopsy polypectomies;  Surgeon: Asia Walters MD;  Location: [facility] ENDOSCOPY;  Service: Gastroenterology   • TONSILLECTOMY         Family History   Problem Relation Age of Onset   • Breast cancer Mother    • Heart attack Father         CABG at age 57 and again at age 70   • Coronary artery disease Paternal Uncle    • Coronary artery disease Paternal Grandmother    • Other Sister         pre-diabetes, thyroid problem   • Other Sister         pre-diabetes       Social History     Socioeconomic History   • Marital status:      Spouse name: Not on file   • Number of children: Not on file   • Years of education: Not on file   • Highest education level: Not on file   Tobacco Use   • Smoking status: Former Smoker     Packs/day: 0.15     Years: 20.00     Pack years: 3.00     Types: Cigarettes     Last attempt to quit:      Years since quittin.6   • Smokeless tobacco: Never Used   Substance and Sexual  "Activity   • Alcohol use: No   • Drug use: No   • Sexual activity: Defer       Most Recent Immunizations   Administered Date(s) Administered   • Hepatitis A 05/04/2018   • Tdap 05/25/2011   • Zostavax 05/08/2017   • flucelvax quad pfs =>4 YRS 10/03/2019         Review of Systems:   Review of Systems   Constitutional: Negative for chills and fever.   Respiratory: Negative for shortness of breath and wheezing.    Skin: Positive for rash.         Objective:      Physical Exam:   Physical Exam   Cardiovascular: Normal rate.   Pulmonary/Chest: Effort normal and breath sounds normal.   Skin: Skin is warm and dry.        Psychiatric: She has a normal mood and affect.         Vital Signs:  Vitals:    08/11/20 1304   BP: 101/66   BP Location: Left arm   Patient Position: Sitting   Cuff Size: Adult   Pulse: 72   Resp: 16   Temp: 99.5 °F (37.5 °C)   TempSrc: Oral   SpO2: 96%   Weight: 51.7 kg (114 lb)   Height: 158.1 cm (62.25\")     Body mass index is 20.68 kg/m².      Results Review:      REVIEWED AND DISCUSSED LAB RESULTS WITH PATIENT      Requested Prescriptions     Signed Prescriptions Disp Refills   • methylPREDNISolone (Medrol) 4 MG tablet 21 tablet 0     Sig: follow package directions   • betamethasone dipropionate (DIPROLENE) 0.05 % cream 15 g 0     Sig: Apply  topically to the appropriate area as directed 2 (Two) Times a Day.     Routine medications provided by this office will also be refilled via pharmacy request.       Current Outpatient Medications:   •  alendronate (FOSAMAX) 70 MG tablet, TAKE 1 TABLET BY MOUTH ONCE WEEKLY BEFORE BREAKFAST, ON AN EMPTY STOMACH: REMAIN UPRIGHT FOR 30 MINUTES:TAKE WITH 8 OUNCES OF WATER, Disp: 12 tablet, Rfl: 0  •  sertraline (ZOLOFT) 100 MG tablet, TAKE ONE TABLET BY MOUTH DAILY, Disp: 90 tablet, Rfl: 0  •  traZODone (DESYREL) 50 MG tablet, TAKE ONE-HALF TO ONE TABLET BY MOUTH EVERY NIGHT AT BEDTIME, Disp: 90 tablet, Rfl: 1  •  betamethasone dipropionate (DIPROLENE) 0.05 % cream, " "Apply  topically to the appropriate area as directed 2 (Two) Times a Day., Disp: 15 g, Rfl: 0  •  methylPREDNISolone (Medrol) 4 MG tablet, follow package directions, Disp: 21 tablet, Rfl: 0       Assessment and Plan:        Problem List Items Addressed This Visit     None      Visit Diagnoses     Irritant contact dermatitis due to other agents    -  Primary    Relevant Medications    methylPREDNISolone (Medrol) 4 MG tablet    betamethasone dipropionate (DIPROLENE) 0.05 % cream    Dermatitis        Relevant Medications    betamethasone dipropionate (DIPROLENE) 0.05 % cream        +medrol dose pack (she does not want short burst)  +topical steriod  Discussed washing all clothing that came in contact.  See patient instructions.      Discussed any change in Rx and discussed visit with patient.  All questions answered.      Return if symptoms worsen or fail to improve.    Edinson \"David\" Eusebio, APRN   08/11/20    Dragon disclaimer:   Much of this encounter note is an electronic transcription/translation of spoken language to printed text. The electronic translation of spoken language may permit erroneous, or at times, nonsensical words or phrases to be inadvertently transcribed; Although I have reviewed the note for such errors, some may still exist.     Additional Patient Counseling:       Patient Instructions   Poison Ivy  If you are going into areas that may have poison ivy, prepare with a product like IvyX or other ivy blocker.  Wash your clothes and pets after being in an area with ivy when returning home. If you come in contact with poison ivy, try a product like Technu     Calamine lotion as directed on package.       Poison Ivy Dermatitis  Poison ivy dermatitis is inflammation of the skin that is caused by chemicals in the leaves of the poison ivy plant. The skin reaction often involves redness, swelling, blisters, and extreme itching.  What are the causes?  This condition is caused by a chemical (urushiol) " found in the sap of the poison ivy plant. This chemical is sticky and can be easily spread to people, animals, and objects. You can get poison ivy dermatitis by:  · Having direct contact with a poison ivy plant.  · Touching animals, other people, or objects that have come in contact with poison ivy and have the chemical on them.  What increases the risk?  This condition is more likely to develop in people who:  · Are outdoors often in wooded or marshy areas.  · Go outdoors without wearing protective clothing, such as closed shoes, long pants, and a long-sleeved shirt.  What are the signs or symptoms?  Symptoms of this condition include:  · Redness of the skin.  · Extreme itching.  · A rash that often includes bumps and blisters. The rash usually appears 48 hours after exposure, if you have been exposed before. If this is the first time you have been exposed, the rash may not appear until a week after exposure.  · Swelling. This may occur if the reaction is more severe.  Symptoms usually last for 1-2 weeks. However, the first time you develop this condition, symptoms may last 3-4 weeks.  How is this diagnosed?  This condition may be diagnosed based on your symptoms and a physical exam. Your health care provider may also ask you about any recent outdoor activity.  How is this treated?  Treatment for this condition will vary depending on how severe it is. Treatment may include:  · Hydrocortisone cream or calamine lotion to relieve itching.  · Oatmeal baths to soothe the skin.  · Medicines, such as over-the-counter antihistamine tablets.  · Oral steroid medicine, for more severe reactions.  Follow these instructions at home:  Medicines  · Take or apply over-the-counter and prescription medicines only as told by your health care provider.  · Use hydrocortisone cream or calamine lotion as needed to soothe the skin and relieve itching.  General instructions  · Do not scratch or rub your skin.  · Apply a cold, wet cloth  (cold compress) to the affected areas or take baths in cool water. This will help with itching. Avoid hot baths and showers.  · Take oatmeal baths as needed. Use colloidal oatmeal. You can get this at your local pharmacy or grocery store. Follow the instructions on the packaging.  · While you have the rash, wash clothes right after you wear them.  · Keep all follow-up visits as told by your health care provider. This is important.  How is this prevented?    · Learn to identify the poison ivy plant and avoid contact with the plant. This plant can be recognized by the number of leaves. Generally, poison ivy has three leaves with flowering branches on a single stem. The leaves are typically glossy, and they have jagged edges that come to a point at the front.  · If you have been exposed to poison ivy, thoroughly wash with soap and water right away. You have about 30 minutes to remove the plant resin before it will cause the rash. Be sure to wash under your fingernails, because any plant resin there will continue to spread the rash.  · When hiking or camping, wear clothes that will help you to avoid exposure on the skin. This includes long pants, a long-sleeved shirt, tall socks, and hiking boots. You can also apply preventive lotion to your skin to help limit exposure.  · If you suspect that your clothes or outdoor gear came in contact with poison ivy, rinse them off outside with a garden hose before you bring them inside your house.  · When doing yard work or gardening, wear gloves, long sleeves, long pants, and boots. Wash your garden tools and gloves if they come in contact with poison ivy.  · If you suspect that your pet has come into contact with poison ivy, wash him or her with pet shampoo and water. Make sure to wear gloves while washing your pet.  Contact a health care provider if you have:  · Open sores in the rash area.  · More redness, swelling, or pain in the affected area.  · Redness that spreads beyond  the rash area.  · Fluid, blood, or pus coming from the affected area.  · A fever.  · A rash over a large area of your body.  · A rash on your eyes, mouth, or genitals.  · A rash that does not improve after a few weeks.  Get help right away if:  · Your face swells or your eyes swell shut.  · You have trouble breathing.  · You have trouble swallowing.  These symptoms may represent a serious problem that is an emergency. Do not wait to see if the symptoms will go away. Get medical help right away. Call your local emergency services (911 in the [place] Do not drive yourself to the hospital.  Summary  · Poison ivy dermatitis is inflammation of the skin that is caused by chemicals in the leaves of the poison ivy plant.  · Symptoms of this condition include redness, itching, a rash, and swelling.  · Do not scratch or rub your skin.  · Take or apply over-the-counter and prescription medicines only as told by your health care provider.  This information is not intended to replace advice given to you by your health care provider. Make sure you discuss any questions you have with your health care provider.  Document Released: 12/15/2001 Document Revised: 04/10/2020 Document Reviewed: 12/13/2019  [employer] Patient Education © 2020 Elsevier Inc.

## 2020-09-11 RX ORDER — SERTRALINE HYDROCHLORIDE 100 MG/1
TABLET, FILM COATED ORAL
Qty: 30 TABLET | Refills: 0 | Status: SHIPPED | OUTPATIENT
Start: 2020-09-11 | End: 2020-10-19 | Stop reason: SDUPTHER

## 2020-10-01 RX ORDER — TRAZODONE HYDROCHLORIDE 50 MG/1
TABLET ORAL
Qty: 90 TABLET | Refills: 0 | Status: SHIPPED | OUTPATIENT
Start: 2020-10-01 | End: 2020-10-19 | Stop reason: SDUPTHER

## 2020-10-19 ENCOUNTER — OFFICE VISIT (OUTPATIENT)
Dept: INTERNAL MEDICINE | Facility: CLINIC | Age: 65
End: 2020-10-19

## 2020-10-19 VITALS
DIASTOLIC BLOOD PRESSURE: 54 MMHG | SYSTOLIC BLOOD PRESSURE: 98 MMHG | TEMPERATURE: 98.9 F | HEART RATE: 70 BPM | BODY MASS INDEX: 20.98 KG/M2 | HEIGHT: 62 IN | OXYGEN SATURATION: 98 % | WEIGHT: 114 LBS

## 2020-10-19 DIAGNOSIS — F32.A ANXIETY AND DEPRESSION: Primary | ICD-10-CM

## 2020-10-19 DIAGNOSIS — F41.9 ANXIETY AND DEPRESSION: Primary | ICD-10-CM

## 2020-10-19 DIAGNOSIS — Z23 NEED FOR PNEUMOCOCCAL VACCINE: ICD-10-CM

## 2020-10-19 PROCEDURE — 90732 PPSV23 VACC 2 YRS+ SUBQ/IM: CPT | Performed by: INTERNAL MEDICINE

## 2020-10-19 PROCEDURE — 99213 OFFICE O/P EST LOW 20 MIN: CPT | Performed by: INTERNAL MEDICINE

## 2020-10-19 PROCEDURE — G0009 ADMIN PNEUMOCOCCAL VACCINE: HCPCS | Performed by: INTERNAL MEDICINE

## 2020-10-19 RX ORDER — TRAZODONE HYDROCHLORIDE 50 MG/1
25-50 TABLET ORAL NIGHTLY
Qty: 90 TABLET | Refills: 1 | Status: SHIPPED | OUTPATIENT
Start: 2020-10-19 | End: 2021-11-19

## 2020-10-19 RX ORDER — SERTRALINE HYDROCHLORIDE 100 MG/1
100 TABLET, FILM COATED ORAL DAILY
Qty: 90 TABLET | Refills: 1 | Status: SHIPPED | OUTPATIENT
Start: 2020-10-19 | End: 2021-05-10

## 2020-10-19 NOTE — PROGRESS NOTES
"Chief Complaint   Patient presents with   • Anxiety   • Hyperlipidemia       Subjective   Stephanie Ferro is a 64 y.o. female.     History of Present Illness     She comes in for follow up of anxiety and depression.  She continues on sertraline and trazodone. By report, there is good compliance with treatment, good tolerance of treatment and good symptom control. She did have some increase in anxiety a week or two ago but that has resolved. Her appetite is good. She tries to follow a prudent diet to control lipid levels.  She also exercises regularly.    The following portions of the patient's history were reviewed and updated as appropriate: allergies, current medications, past family history, past medical history, past social history, past surgical history and problem list.    Review of Systems   Constitutional: Negative for appetite change.   HENT: Negative for nosebleeds.    Eyes: Negative for blurred vision and double vision.   Respiratory: Negative for cough and shortness of breath.    Cardiovascular: Negative for chest pain, palpitations and leg swelling.   Psychiatric/Behavioral:        Nervousness, anxiety, depression controlled with medication.         Current Outpatient Medications:   •  alendronate (FOSAMAX) 70 MG tablet, TAKE 1 TABLET BY MOUTH ONCE WEEKLY BEFORE BREAKFAST, ON AN EMPTY STOMACH: REMAIN UPRIGHT FOR 30 MINUTES:TAKE WITH 8 OUNCES OF WATER, Disp: 12 tablet, Rfl: 0  •  sertraline (ZOLOFT) 100 MG tablet, Take 1 tablet by mouth Daily., Disp: 90 tablet, Rfl: 1  •  traZODone (DESYREL) 50 MG tablet, Take 0.5-1 tablets by mouth Every Night., Disp: 90 tablet, Rfl: 1        Objective     BP 98/54   Pulse 70   Temp 98.9 °F (37.2 °C)   Ht 158.1 cm (62.25\")   Wt 51.7 kg (114 lb)   SpO2 98%   BMI 20.68 kg/m²     Physical Exam  Vitals signs and nursing note reviewed.   Constitutional:       General: She is not in acute distress.     Appearance: She is well-developed.   Neck:      Vascular: Normal " carotid pulses. No carotid bruit.   Cardiovascular:      Rate and Rhythm: Regular rhythm.      Pulses:           Carotid pulses are 2+ on the right side and 2+ on the left side.     Heart sounds: S1 normal and S2 normal. No murmur. No friction rub. No gallop.    Pulmonary:      Effort: Pulmonary effort is normal.      Breath sounds: Normal breath sounds. No wheezing, rhonchi or rales.   Skin:     General: Skin is warm and dry.   Neurological:      Mental Status: She is alert and oriented to person, place, and time.   Psychiatric:         Mood and Affect: Mood normal.         Behavior: Behavior normal.           Assessment/Plan   Diagnoses and all orders for this visit:    1. Anxiety and depression (Primary)    2. Need for pneumococcal vaccine  -     Pneumococcal Polysaccharide Vaccine 23-Valent Greater Than or Equal To 3yo Subcutaneous / IM    Other orders  -     traZODone (DESYREL) 50 MG tablet; Take 0.5-1 tablets by mouth Every Night.  Dispense: 90 tablet; Refill: 1  -     sertraline (ZOLOFT) 100 MG tablet; Take 1 tablet by mouth Daily.  Dispense: 90 tablet; Refill: 1      She will continue current meds. Will return as scheduled.  Recommended she get the high dose flu shot at her pharmacy.

## 2020-10-20 RX ORDER — ALENDRONATE SODIUM 70 MG/1
70 TABLET ORAL WEEKLY
Qty: 12 TABLET | Refills: 3 | Status: SHIPPED | OUTPATIENT
Start: 2020-10-20 | End: 2021-05-04

## 2020-10-20 NOTE — TELEPHONE ENCOUNTER
Caller: Stephanie Ferro    Relationship: Self    Best call back number: [phone]    Medication needed:   Requested Prescriptions     Pending Prescriptions Disp Refills   • alendronate (FOSAMAX) 70 MG tablet 12 tablet 0     Sig: Take 1 tablet by mouth 1 (One) Time Per Week. BEFORE BREAKFAST ON AN EMPTY STOMACH: REMAIN UPRIGHT FOR 30 MINUTES:TAKE WITH 8 OUNCES OF WATER       When do you need the refill by: ASAP     What details did the patient provide when requesting the medication: PATIENT STATES SHE HAD APPT YESTERDAY, OTHER MEDICATIONS WERE REFILLED AND THIS ONE WAS NOT     Does the patient have less than a 3 day supply:  [x] Yes  [] No    What is the patient's preferred pharmacy: [facility] [address] AT [facility] & [place] - [phone] [facility] [phone] [place]

## 2021-03-19 ENCOUNTER — BULK ORDERING (OUTPATIENT)
Dept: CASE MANAGEMENT | Facility: OTHER | Age: 66
End: 2021-03-19

## 2021-03-19 DIAGNOSIS — Z23 IMMUNIZATION DUE: ICD-10-CM

## 2021-05-04 ENCOUNTER — APPOINTMENT (OUTPATIENT)
Dept: WOMENS IMAGING | Facility: HOSPITAL | Age: 66
End: 2021-05-04

## 2021-05-04 ENCOUNTER — OFFICE VISIT (OUTPATIENT)
Dept: INTERNAL MEDICINE | Facility: CLINIC | Age: 66
End: 2021-05-04

## 2021-05-04 VITALS
OXYGEN SATURATION: 96 % | TEMPERATURE: 98.1 F | DIASTOLIC BLOOD PRESSURE: 56 MMHG | BODY MASS INDEX: 20.61 KG/M2 | SYSTOLIC BLOOD PRESSURE: 114 MMHG | HEIGHT: 62 IN | HEART RATE: 70 BPM | WEIGHT: 112 LBS

## 2021-05-04 DIAGNOSIS — E78.5 HYPERLIPIDEMIA, UNSPECIFIED HYPERLIPIDEMIA TYPE: Primary | ICD-10-CM

## 2021-05-04 DIAGNOSIS — M25.511 RIGHT SHOULDER PAIN, UNSPECIFIED CHRONICITY: ICD-10-CM

## 2021-05-04 DIAGNOSIS — Z12.31 ENCOUNTER FOR SCREENING MAMMOGRAM FOR BREAST CANCER: Primary | ICD-10-CM

## 2021-05-04 DIAGNOSIS — Z00.00 MEDICARE ANNUAL WELLNESS VISIT, SUBSEQUENT: Primary | ICD-10-CM

## 2021-05-04 DIAGNOSIS — Z78.0 POST-MENOPAUSAL: ICD-10-CM

## 2021-05-04 LAB
ALBUMIN SERPL-MCNC: 4.7 G/DL (ref 3.5–5.2)
ALBUMIN/GLOB SERPL: 2.5 G/DL
ALP SERPL-CCNC: 51 U/L (ref 39–117)
ALT SERPL-CCNC: 12 U/L (ref 1–33)
AST SERPL-CCNC: 14 U/L (ref 1–32)
BILIRUB SERPL-MCNC: 0.3 MG/DL (ref 0–1.2)
BILIRUB UR QL STRIP: NEGATIVE
BUN SERPL-MCNC: 18 MG/DL (ref 8–23)
BUN/CREAT SERPL: 21.4 (ref 7–25)
CALCIUM SERPL-MCNC: 9.7 MG/DL (ref 8.6–10.5)
CHLORIDE SERPL-SCNC: 102 MMOL/L (ref 98–107)
CHOLEST SERPL-MCNC: 211 MG/DL (ref 0–200)
CHOLEST/HDLC SERPL: 3.77 {RATIO}
CLARITY UR: CLEAR
CO2 SERPL-SCNC: 30 MMOL/L (ref 22–29)
COLOR UR: YELLOW
CREAT SERPL-MCNC: 0.84 MG/DL (ref 0.57–1)
DEPRECATED RDW RBC AUTO: 39.3 FL (ref 37–54)
ERYTHROCYTE [DISTWIDTH] IN BLOOD BY AUTOMATED COUNT: 11.8 % (ref 12.3–15.4)
GLOBULIN SER CALC-MCNC: 1.9 GM/DL
GLUCOSE SERPL-MCNC: 88 MG/DL (ref 65–99)
GLUCOSE UR STRIP-MCNC: NEGATIVE MG/DL
HCT VFR BLD AUTO: 41.4 % (ref 34–46.6)
HDLC SERPL-MCNC: 56 MG/DL (ref 40–60)
HGB BLD-MCNC: 13.6 G/DL (ref 12–15.9)
HGB UR QL STRIP.AUTO: NEGATIVE
KETONES UR QL STRIP: NEGATIVE
LDLC SERPL CALC-MCNC: 137 MG/DL (ref 0–100)
LEUKOCYTE ESTERASE UR QL STRIP.AUTO: NEGATIVE
MCH RBC QN AUTO: 30.4 PG (ref 26.6–33)
MCHC RBC AUTO-ENTMCNC: 32.9 G/DL (ref 31.5–35.7)
MCV RBC AUTO: 92.6 FL (ref 79–97)
NITRITE UR QL STRIP: NEGATIVE
PH UR STRIP.AUTO: <=5 [PH] (ref 5–8)
PLATELET # BLD AUTO: 192 10*3/MM3 (ref 140–450)
PMV BLD AUTO: 9.9 FL (ref 6–12)
POTASSIUM SERPL-SCNC: 4.3 MMOL/L (ref 3.5–5.2)
PROT SERPL-MCNC: 6.6 G/DL (ref 6–8.5)
PROT UR QL STRIP: NEGATIVE
RBC # BLD AUTO: 4.47 10*6/MM3 (ref 3.77–5.28)
SODIUM SERPL-SCNC: 142 MMOL/L (ref 136–145)
SP GR UR STRIP: 1.02 (ref 1–1.03)
TRIGL SERPL-MCNC: 101 MG/DL (ref 0–150)
TSH SERPL DL<=0.005 MIU/L-ACNC: 3.42 UIU/ML (ref 0.27–4.2)
UROBILINOGEN UR QL STRIP: NORMAL
VLDLC SERPL CALC-MCNC: 18 MG/DL (ref 5–40)
WBC # BLD AUTO: 4.48 10*3/MM3 (ref 3.4–10.8)

## 2021-05-04 PROCEDURE — 96160 PT-FOCUSED HLTH RISK ASSMT: CPT | Performed by: INTERNAL MEDICINE

## 2021-05-04 PROCEDURE — 77080 DXA BONE DENSITY AXIAL: CPT | Performed by: INTERNAL MEDICINE

## 2021-05-04 PROCEDURE — 36415 COLL VENOUS BLD VENIPUNCTURE: CPT | Performed by: INTERNAL MEDICINE

## 2021-05-04 PROCEDURE — 77067 SCR MAMMO BI INCL CAD: CPT | Performed by: INTERNAL MEDICINE

## 2021-05-04 PROCEDURE — 73030 X-RAY EXAM OF SHOULDER: CPT | Performed by: RADIOLOGY

## 2021-05-04 PROCEDURE — G0439 PPPS, SUBSEQ VISIT: HCPCS | Performed by: INTERNAL MEDICINE

## 2021-05-04 PROCEDURE — 77067 SCR MAMMO BI INCL CAD: CPT | Performed by: RADIOLOGY

## 2021-05-04 PROCEDURE — 85027 COMPLETE CBC AUTOMATED: CPT | Performed by: INTERNAL MEDICINE

## 2021-05-04 PROCEDURE — 73030 X-RAY EXAM OF SHOULDER: CPT | Performed by: INTERNAL MEDICINE

## 2021-05-04 PROCEDURE — 1170F FXNL STATUS ASSESSED: CPT | Performed by: INTERNAL MEDICINE

## 2021-05-04 PROCEDURE — 81003 URINALYSIS AUTO W/O SCOPE: CPT | Performed by: INTERNAL MEDICINE

## 2021-05-04 PROCEDURE — 1125F AMNT PAIN NOTED PAIN PRSNT: CPT | Performed by: INTERNAL MEDICINE

## 2021-05-04 PROCEDURE — 1159F MED LIST DOCD IN RCRD: CPT | Performed by: INTERNAL MEDICINE

## 2021-05-04 NOTE — PATIENT INSTRUCTIONS
Medicare Wellness  Personal Prevention Plan of Service     Date of Office Visit:  2021  Encounter Provider:  Stephanie Condon MD  Place of Service:  [facility] PRIMARY [facility]  Patient Name: Stephanie Ferro  :  1955    As part of the Medicare Wellness portion of your visit today, we are providing you with this personalized preventive plan of services (PPPS). This plan is based upon recommendations of the [place] Preventive Services Task Force (USPSTF) and the Advisory Committee on Immunization Practices (ACIP).    This lists the preventive care services that should be considered, and provides dates of when you are due. Items listed as completed are up-to-date and do not require any further intervention.    Health Maintenance   Topic Date Due   • ANNUAL WELLNESS VISIT  Never done   • ZOSTER VACCINE (2 of 3) 2017   • LIPID PANEL  10/03/2020   • TDAP/TD VACCINES (2 - Td) 2021   • INFLUENZA VACCINE  2021   • MAMMOGRAM  2022   • PAP SMEAR  10/03/2022   • DXA SCAN  2023   • COLORECTAL CANCER SCREENING  2028   • HEPATITIS C SCREENING  Completed   • COVID-19 Vaccine  Completed   • Pneumococcal Vaccine 65+  Completed       Orders Placed This Encounter   Procedures   • XR Shoulder 2+ View Right (In Office)     May go to check out after xray     Order Specific Question:   Reason for Exam:     Answer:   right shoulder pain     Order Specific Question:   Release to patient     Answer:   Immediate       Return in about 6 months (around 2021) for Follow up, Fasting.    Please send us a copy of your living will/advanced directive     Check with your pharmacy about the new zoster vaccine.

## 2021-05-04 NOTE — PROGRESS NOTES
The ABCs of the Annual Wellness Visit  Subsequent Medicare Wellness Visit    Chief Complaint   Patient presents with   • Medicare Wellness-subsequent       Subjective   History of Present Illness:  Stephanie Ferro is a 65 y.o. female who presents for a Subsequent Medicare Wellness Visit.    HEALTH RISK ASSESSMENT    Recent Hospitalizations:  No hospitalization(s) within the last year.    Current Medical Providers:  Patient Care Team:  Stephanie Condon MD as PCP - General (Internal Medicine)    Smoking Status:  Social History     Tobacco Use   Smoking Status Former Smoker   • Packs/day: 0.15   • Years: 20.00   • Pack years: 3.00   • Types: Cigarettes   • Quit date:    • Years since quittin.3   Smokeless Tobacco Never Used       Alcohol Consumption:  Social History     Substance and Sexual Activity   Alcohol Use No       Depression Screen:   PHQ-2/PHQ-9 Depression Screening 2021   Little interest or pleasure in doing things 0   Feeling down, depressed, or hopeless 0   Trouble falling or staying asleep, or sleeping too much 1   Feeling tired or having little energy 1   Poor appetite or overeating 0   Feeling bad about yourself - or that you are a failure or have let yourself or your family down 1   Trouble concentrating on things, such as reading the newspaper or watching television 0   Moving or speaking so slowly that other people could have noticed. Or the opposite - being so fidgety or restless that you have been moving around a lot more than usual 0   Thoughts that you would be better off dead, or of hurting yourself in some way 0   Total Score 3   If you checked off any problems, how difficult have these problems made it for you to do your work, take care of things at home, or get along with other people? Not difficult at all       Fall Risk Screen:  STEADI Fall Risk Assessment was completed, and patient is at LOW risk for falls.Assessment completed on:2021    Health Habits and Functional and  Cognitive Screening:  Functional & Cognitive Status 5/4/2021   Do you have difficulty preparing food and eating? No   Do you have difficulty bathing yourself, getting dressed or grooming yourself? No   Do you have difficulty using the toilet? No   Do you have difficulty moving around from place to place? No   Do you have trouble with steps or getting out of a bed or a chair? No   Current Diet Well Balanced Diet   Dental Exam Up to date   Eye Exam Up to date   Exercise (times per week) 4 times per week   Current Exercises Include (No Data)        Exercise Comment goes to gym    Do you need help using the phone?  No   Are you deaf or do you have serious difficulty hearing?  No   Do you need help with transportation? No   Do you need help shopping? No   Do you need help preparing meals?  No   Do you need help with housework?  No   Do you need help with laundry? No   Do you need help taking your medications? No   Do you need help managing money? No   Do you ever drive or ride in a car without wearing a seat belt? No   Have you felt unusual stress, anger or loneliness in the last month? No   Who do you live with? Spouse   If you need help, do you have trouble finding someone available to you? No   Have you been bothered in the last four weeks by sexual problems? No   Do you have difficulty concentrating, remembering or making decisions? No         Does the patient have evidence of cognitive impairment? No    Asprin use counseling:Does not need ASA (and currently is not on it)    Age-appropriate Screening Schedule:  Refer to the list below for future screening recommendations based on patient's age, sex and/or medical conditions. Orders for these recommended tests are listed in the plan section. The patient has been provided with a written plan.    Health Maintenance   Topic Date Due   • ZOSTER VACCINE (2 of 3) 07/03/2017   • LIPID PANEL  10/03/2020   • TDAP/TD VACCINES (2 - Td) 05/25/2021   • INFLUENZA VACCINE   "08/01/2021   • MAMMOGRAM  05/04/2022   • PAP SMEAR  10/03/2022   • DXA SCAN  05/04/2023          The following portions of the patient's history were reviewed and updated as appropriate: allergies, current medications, past family history, past medical history, past social history, past surgical history and problem list.    Outpatient Medications Prior to Visit   Medication Sig Dispense Refill   • sertraline (ZOLOFT) 100 MG tablet Take 1 tablet by mouth Daily. 90 tablet 1   • traZODone (DESYREL) 50 MG tablet Take 0.5-1 tablets by mouth Every Night. 90 tablet 1   • alendronate (FOSAMAX) 70 MG tablet Take 1 tablet by mouth 1 (One) Time Per Week. BEFORE BREAKFAST ON AN EMPTY STOMACH,REMAIN UPRIGHT FOR 30 MINUTES:TAKE WITH 8 OUNCES OF WATER 12 tablet 3     No facility-administered medications prior to visit.       Patient Active Problem List   Diagnosis   • Osteoporosis   • Hyperlipidemia   • Anxiety and depression   • FH: premature coronary heart disease   • Colon cancer screening       Advanced Care Planning:  ACP discussion was held with the patient during this visit. Patient has an advance directive (not in EMR), copy requested.    Review of Systems    Compared to one year ago, the patient feels her physical health is the same. Generally the same,  She has had some problems with right shoulder pain at times.  She notices this while seated.  Compared to one year ago, the patient feels her mental health is the same.    Reviewed chart for potential of high risk medication in the elderly: yes  Reviewed chart for potential of harmful drug interactions in the elderly:yes    Objective         Vitals:    05/04/21 1346   BP: 114/56   Pulse: 70   Temp: 98.1 °F (36.7 °C)   SpO2: 96%   Weight: 50.8 kg (112 lb)   Height: 158.1 cm (62.25\")   PainSc: 4  Comment: right shoulder pain       Body mass index is 20.32 kg/m².  Discussed the patient's BMI with her. The BMI is in the acceptable range.    Physical Exam  Musculoskeletal:     "  Right shoulder: Normal.      Left shoulder: Normal.               Assessment/Plan   Medicare Risks and Personalized Health Plan  CMS Preventative Services Quick Reference  Advance Directive Discussion  Immunizations Discussed/Encouraged (specific immunizations; Shingrix )    The above risks/problems have been discussed with the patient.  Pertinent information has been shared with the patient in the After Visit Summary.  Follow up plans and orders are seen below in the Assessment/Plan Section.    Diagnoses and all orders for this visit:    1. Medicare annual wellness visit, subsequent (Primary)    2. Right shoulder pain, unspecified chronicity  -     XR Shoulder 2+ View Right (In Office)      Follow Up:  Return in about 6 months (around 11/4/2021) for Follow up, Fasting.     An After Visit Summary and PPPS were given to the patient.

## 2021-05-10 RX ORDER — SERTRALINE HYDROCHLORIDE 100 MG/1
TABLET, FILM COATED ORAL
Qty: 90 TABLET | Refills: 0 | Status: SHIPPED | OUTPATIENT
Start: 2021-05-10 | End: 2021-08-16

## 2021-08-16 DIAGNOSIS — F41.9 ANXIETY AND DEPRESSION: Primary | ICD-10-CM

## 2021-08-16 DIAGNOSIS — F32.A ANXIETY AND DEPRESSION: Primary | ICD-10-CM

## 2021-08-16 RX ORDER — SERTRALINE HYDROCHLORIDE 100 MG/1
TABLET, FILM COATED ORAL
Qty: 90 TABLET | Refills: 0 | Status: SHIPPED | OUTPATIENT
Start: 2021-08-16 | End: 2021-11-19

## 2021-10-26 ENCOUNTER — TELEPHONE (OUTPATIENT)
Dept: INTERNAL MEDICINE | Facility: CLINIC | Age: 66
End: 2021-10-26

## 2021-10-26 DIAGNOSIS — R05.8 COUGH WITH EXPOSURE TO COVID-19 VIRUS: Primary | ICD-10-CM

## 2021-10-26 DIAGNOSIS — Z20.822 COUGH WITH EXPOSURE TO COVID-19 VIRUS: Primary | ICD-10-CM

## 2021-10-26 NOTE — TELEPHONE ENCOUNTER
PATIENT RETURNED CALL AND STATED THAT SHE WAS NEGATIVE FOR COVID AFTER A 15 MIN RAPID TEST. PATIENT WOULD LIKE TO KNOW HOW RELIABLE DR BRODERICK THINKS THE RAPIDS ARE?    CALL BACK   [phone]

## 2021-10-26 NOTE — TELEPHONE ENCOUNTER
Caller: Stephanie Ferro    Relationship to patient: Self    Best call back number: [phone]    Date of exposure: 10/22/2021    COVID19 symptoms: SORE THROAT, CHILLS    Additional information or concerns: PATIENT'S  TESTED POSITIVE TODAY.  PATIENT HAS A RAPID TEST SCHEDULED THIS AFTERNOON.  SHE WOULD LIKE TO KNOW IF IT WOULD BE POSSIBLE TO GET THE ANTIBODIES INFUSION OR WHAT ELSE SHE SHOULD DO IF SHE DOES TEST POSITIVE.  PLEASE CALL AND ADVISE     What is the patients preferred pharmacy: [facility] [address] & [place] - [phone] [facility] [phone]   [phone]

## 2021-10-26 NOTE — TELEPHONE ENCOUNTER
I spoke with the pt and she is scheduled for 2:20 and will call back with her results and send a note to Dr Condon to decide what needs to be done    OK FOR HUB TO READ AND DOCUMENT HER RESULTS

## 2021-10-27 ENCOUNTER — LAB (OUTPATIENT)
Dept: INTERNAL MEDICINE | Facility: CLINIC | Age: 66
End: 2021-10-27

## 2021-10-27 DIAGNOSIS — Z20.822 COUGH WITH EXPOSURE TO COVID-19 VIRUS: ICD-10-CM

## 2021-10-27 DIAGNOSIS — R05.8 COUGH WITH EXPOSURE TO COVID-19 VIRUS: ICD-10-CM

## 2021-10-27 NOTE — TELEPHONE ENCOUNTER
They have improved in efficacy but if she would like to come to drive-through today and get a PCR I will be glad to order, just let me know.

## 2021-10-28 LAB
LABCORP SARS-COV-2, NAA 2 DAY TAT: NORMAL
SARS-COV-2 RNA RESP QL NAA+PROBE: NOT DETECTED

## 2021-11-02 LAB — REF LAB TEST METHOD: NORMAL

## 2021-11-03 ENCOUNTER — OFFICE VISIT (OUTPATIENT)
Dept: INTERNAL MEDICINE | Facility: CLINIC | Age: 66
End: 2021-11-03

## 2021-11-03 VITALS
HEIGHT: 62 IN | WEIGHT: 110 LBS | TEMPERATURE: 99.5 F | BODY MASS INDEX: 20.24 KG/M2 | DIASTOLIC BLOOD PRESSURE: 54 MMHG | SYSTOLIC BLOOD PRESSURE: 102 MMHG

## 2021-11-03 DIAGNOSIS — E78.5 HYPERLIPIDEMIA, UNSPECIFIED HYPERLIPIDEMIA TYPE: Primary | ICD-10-CM

## 2021-11-03 DIAGNOSIS — R73.01 ELEVATED FASTING GLUCOSE: ICD-10-CM

## 2021-11-03 DIAGNOSIS — Z20.822 CLOSE EXPOSURE TO COVID-19 VIRUS: ICD-10-CM

## 2021-11-03 PROCEDURE — 99214 OFFICE O/P EST MOD 30 MIN: CPT | Performed by: INTERNAL MEDICINE

## 2021-11-03 NOTE — PROGRESS NOTES
Chief Complaint   Patient presents with   • Hyperlipidemia     follow up   • Anxiety   • Allergic Reaction     also has had a raction to flu vaccine, body aches, fatigue, sneezing, runny nose   • Exposure To Known Illness       Subjective   Stephanie Ferro is a 65 y.o. female.     History of Present Illness     Hyperlipidemia:    This is a chronic problem currently treated with diet.  Her most recent lipid panel showed:  Lab Results   Component Value Date    CHOL 231 (H) 10/03/2019    CHLPL 211 (H) 05/04/2021    TRIG 101 05/04/2021    HDL 56 05/04/2021     (H) 05/04/2021     Anxiety:  She continues to take sertraline and trazodone.     She had flu vaccine Monday, two days ago. Developed chills, body aches, fatigue Monday night. She feel sbetter, but still has flu-like symptoms. Runny nose, sneezing, mild sore throat. Appetite is down.   has covid.  They are self isolating at home.  She has tested negative.     The following portions of the patient's history were reviewed and updated as appropriate: allergies, current medications, past family history, past medical history, past social history, past surgical history and problem list.      Current Outpatient Medications:   •  sertraline (ZOLOFT) 100 MG tablet, TAKE ONE TABLET BY MOUTH DAILY, Disp: 90 tablet, Rfl: 0  •  traZODone (DESYREL) 50 MG tablet, Take 0.5-1 tablets by mouth Every Night., Disp: 90 tablet, Rfl: 1           Review of Systems   Constitutional: Positive for appetite change. Negative for fever.   HENT: Positive for rhinorrhea (clear) and sneezing. Negative for ear pain, nosebleeds, swollen glands and trouble swallowing.    Eyes: Negative for blurred vision and double vision.   Respiratory: Positive for cough (some). Negative for shortness of breath and wheezing.    Cardiovascular: Negative for chest pain, palpitations and leg swelling.   Gastrointestinal: Negative for diarrhea, nausea and vomiting.   Neurological:        No loss taste/smell  "          Objective     /54   Temp 99.5 °F (37.5 °C)   Ht 158.1 cm (62.25\")   Wt 49.9 kg (110 lb)   BMI 19.96 kg/m²       Physical Exam  Vitals and nursing note reviewed.   Constitutional:       General: She is not in acute distress.     Appearance: She is well-developed.   Neck:      Vascular: Normal carotid pulses. No carotid bruit.   Cardiovascular:      Rate and Rhythm: Regular rhythm.      Pulses:           Carotid pulses are 2+ on the right side and 2+ on the left side.     Heart sounds: S1 normal and S2 normal. No murmur heard.  No friction rub. No gallop.    Pulmonary:      Effort: Pulmonary effort is normal.      Breath sounds: Normal breath sounds. No wheezing, rhonchi or rales.   Skin:     General: Skin is warm and dry.   Neurological:      Mental Status: She is alert and oriented to person, place, and time.           Assessment/Plan   Diagnoses and all orders for this visit:    1. Hyperlipidemia, unspecified hyperlipidemia type (Primary)  -     Comprehensive Metabolic Panel  -     Lipid Panel With / Chol / HDL Ratio  -     CBC & Differential  -     CT Cardiac Calcium Score Without Dye; Future    2. Close exposure to COVID-19 virus  -     COVID-19,[facility] ROUTINE, NP/OP SWAB IN TRANSPORT MEDIA OR ESWAB 72 HR TAT - Swab, Nasopharynx      With recent onset of flu-like symptoms and exposure to Covid, will test again. She has had two negative tests so far, most recent test two days ago.   Discussed hyperlipidemia.  She has mildly elevated total and LDL cholesterol.  She is reluctant to take medication. Will do CT cardiac calcium score.            No follow-ups on file.  "

## 2021-11-04 ENCOUNTER — TELEPHONE (OUTPATIENT)
Dept: INTERNAL MEDICINE | Facility: CLINIC | Age: 66
End: 2021-11-04

## 2021-11-04 LAB
ALBUMIN SERPL-MCNC: 4.5 G/DL (ref 3.8–4.8)
ALBUMIN/GLOB SERPL: 2 {RATIO} (ref 1.2–2.2)
ALP SERPL-CCNC: 68 IU/L (ref 44–121)
ALT SERPL-CCNC: 12 IU/L (ref 0–32)
AST SERPL-CCNC: 19 IU/L (ref 0–40)
BASOPHILS # BLD AUTO: 0 X10E3/UL (ref 0–0.2)
BASOPHILS NFR BLD AUTO: 0 %
BILIRUB SERPL-MCNC: 0.2 MG/DL (ref 0–1.2)
BUN SERPL-MCNC: 13 MG/DL (ref 8–27)
BUN/CREAT SERPL: 17 (ref 12–28)
CALCIUM SERPL-MCNC: 9.1 MG/DL (ref 8.7–10.3)
CHLORIDE SERPL-SCNC: 103 MMOL/L (ref 96–106)
CHOLEST SERPL-MCNC: 210 MG/DL (ref 100–199)
CHOLEST/HDLC SERPL: 4.1 RATIO (ref 0–4.4)
CO2 SERPL-SCNC: 20 MMOL/L (ref 20–29)
CREAT SERPL-MCNC: 0.78 MG/DL (ref 0.57–1)
EOSINOPHIL # BLD AUTO: 0 X10E3/UL (ref 0–0.4)
EOSINOPHIL NFR BLD AUTO: 0 %
ERYTHROCYTE [DISTWIDTH] IN BLOOD BY AUTOMATED COUNT: 11.8 % (ref 11.7–15.4)
GLOBULIN SER CALC-MCNC: 2.3 G/DL (ref 1.5–4.5)
GLUCOSE SERPL-MCNC: 130 MG/DL (ref 65–99)
HCT VFR BLD AUTO: 42.1 % (ref 34–46.6)
HDLC SERPL-MCNC: 51 MG/DL
HGB BLD-MCNC: 13.9 G/DL (ref 11.1–15.9)
IMM GRANULOCYTES # BLD AUTO: 0 X10E3/UL (ref 0–0.1)
IMM GRANULOCYTES NFR BLD AUTO: 0 %
LABCORP SARS-COV-2, NAA 2 DAY TAT: NORMAL
LDLC SERPL CALC-MCNC: 128 MG/DL (ref 0–99)
LYMPHOCYTES # BLD AUTO: 0.8 X10E3/UL (ref 0.7–3.1)
LYMPHOCYTES NFR BLD AUTO: 15 %
MCH RBC QN AUTO: 29 PG (ref 26.6–33)
MCHC RBC AUTO-ENTMCNC: 33 G/DL (ref 31.5–35.7)
MCV RBC AUTO: 88 FL (ref 79–97)
MONOCYTES # BLD AUTO: 0.5 X10E3/UL (ref 0.1–0.9)
MONOCYTES NFR BLD AUTO: 9 %
NEUTROPHILS # BLD AUTO: 4.3 X10E3/UL (ref 1.4–7)
NEUTROPHILS NFR BLD AUTO: 76 %
PLATELET # BLD AUTO: 168 X10E3/UL (ref 150–450)
POTASSIUM SERPL-SCNC: 3.8 MMOL/L (ref 3.5–5.2)
PROT SERPL-MCNC: 6.8 G/DL (ref 6–8.5)
RBC # BLD AUTO: 4.8 X10E6/UL (ref 3.77–5.28)
SARS-COV-2 RNA RESP QL NAA+PROBE: DETECTED
SODIUM SERPL-SCNC: 139 MMOL/L (ref 134–144)
TRIGL SERPL-MCNC: 176 MG/DL (ref 0–149)
VLDLC SERPL CALC-MCNC: 31 MG/DL (ref 5–40)
WBC # BLD AUTO: 5.7 X10E3/UL (ref 3.4–10.8)

## 2021-11-04 PROCEDURE — 36415 COLL VENOUS BLD VENIPUNCTURE: CPT | Performed by: INTERNAL MEDICINE

## 2021-11-04 NOTE — TELEPHONE ENCOUNTER
Patient has been informed she is positive of covid 19 ! She was in for here routine office visit came in with symptoms by passing our screeners downstairs,  has been home positive with covid for a week!

## 2021-11-04 NOTE — TELEPHONE ENCOUNTER
----- Message from Stephanie Condon MD sent at 11/4/2021  7:42 AM EDT -----  Can Hemoglobin A1c be added to lab done yesterday?  Diagnosis is elevated fasting glucose.

## 2021-11-05 LAB
HBA1C MFR BLD: 5.6 % (ref 4.8–5.6)
Lab: NORMAL
WRITTEN AUTHORIZATION: NORMAL

## 2021-11-08 LAB — REF LAB TEST METHOD: NORMAL

## 2021-11-19 DIAGNOSIS — F41.9 ANXIETY AND DEPRESSION: ICD-10-CM

## 2021-11-19 DIAGNOSIS — F32.A ANXIETY AND DEPRESSION: ICD-10-CM

## 2021-11-19 RX ORDER — SERTRALINE HYDROCHLORIDE 100 MG/1
TABLET, FILM COATED ORAL
Qty: 90 TABLET | Refills: 1 | Status: SHIPPED | OUTPATIENT
Start: 2021-11-19 | End: 2022-06-03 | Stop reason: SDUPTHER

## 2021-11-19 RX ORDER — TRAZODONE HYDROCHLORIDE 50 MG/1
TABLET ORAL
Qty: 90 TABLET | Refills: 1 | Status: SHIPPED | OUTPATIENT
Start: 2021-11-19 | End: 2022-07-28 | Stop reason: SDUPTHER

## 2021-12-07 ENCOUNTER — APPOINTMENT (OUTPATIENT)
Dept: CT IMAGING | Facility: HOSPITAL | Age: 66
End: 2021-12-07

## 2022-01-06 ENCOUNTER — HOSPITAL ENCOUNTER (OUTPATIENT)
Dept: CT IMAGING | Facility: HOSPITAL | Age: 67
Discharge: HOME | End: 2022-01-06
Admitting: INTERNAL MEDICINE

## 2022-01-06 DIAGNOSIS — E78.5 HYPERLIPIDEMIA, UNSPECIFIED HYPERLIPIDEMIA TYPE: ICD-10-CM

## 2022-01-06 PROCEDURE — 75571 CT HRT W/O DYE W/CA TEST: CPT

## 2022-01-08 ENCOUNTER — PATIENT MESSAGE (OUTPATIENT)
Dept: INTERNAL MEDICINE | Facility: CLINIC | Age: 67
End: 2022-01-08

## 2022-01-10 NOTE — TELEPHONE ENCOUNTER
From: Stephanie Ferro  To: Stephanie Condon MD  Sent: 1/8/2022 8:52 PM EST  Subject: My cardiac calcium score and my cholesterol levels    My cardiac calcium score is 0, which I understand is good and indicates no detectable calcified plaques adhering to my arteries. My blood cholesterol levels tend to be high, though, including my triglicerides. What do these two results indicate for my overall heart health and risk for heart attack?  Thank you

## 2022-05-31 ENCOUNTER — TELEPHONE (OUTPATIENT)
Dept: INTERNAL MEDICINE | Facility: CLINIC | Age: 67
End: 2022-05-31

## 2022-05-31 NOTE — TELEPHONE ENCOUNTER
Left patient a voicemail about rescheduling her appt tomorrow     Patient appt was not in a new patient slot need to fix it

## 2022-06-03 ENCOUNTER — OFFICE VISIT (OUTPATIENT)
Dept: INTERNAL MEDICINE | Facility: CLINIC | Age: 67
End: 2022-06-03

## 2022-06-03 VITALS
SYSTOLIC BLOOD PRESSURE: 100 MMHG | BODY MASS INDEX: 20.72 KG/M2 | HEIGHT: 62 IN | DIASTOLIC BLOOD PRESSURE: 61 MMHG | WEIGHT: 112.6 LBS | TEMPERATURE: 98.4 F

## 2022-06-03 DIAGNOSIS — H00.015 HORDEOLUM EXTERNUM LEFT LOWER EYELID: ICD-10-CM

## 2022-06-03 DIAGNOSIS — Z76.89 ENCOUNTER TO ESTABLISH CARE: Primary | ICD-10-CM

## 2022-06-03 DIAGNOSIS — F41.9 ANXIETY AND DEPRESSION: ICD-10-CM

## 2022-06-03 DIAGNOSIS — Z12.31 ENCOUNTER FOR SCREENING MAMMOGRAM FOR MALIGNANT NEOPLASM OF BREAST: ICD-10-CM

## 2022-06-03 DIAGNOSIS — F41.9 ANXIETY AND DEPRESSION: Chronic | ICD-10-CM

## 2022-06-03 DIAGNOSIS — G47.00 INSOMNIA, UNSPECIFIED TYPE: ICD-10-CM

## 2022-06-03 DIAGNOSIS — F32.A ANXIETY AND DEPRESSION: ICD-10-CM

## 2022-06-03 DIAGNOSIS — M25.511 ACUTE PAIN OF RIGHT SHOULDER: ICD-10-CM

## 2022-06-03 DIAGNOSIS — F32.A ANXIETY AND DEPRESSION: Chronic | ICD-10-CM

## 2022-06-03 PROCEDURE — 99214 OFFICE O/P EST MOD 30 MIN: CPT | Performed by: NURSE PRACTITIONER

## 2022-06-03 RX ORDER — MELOXICAM 15 MG/1
15 TABLET ORAL DAILY
Qty: 30 TABLET | Refills: 0 | Status: SHIPPED | OUTPATIENT
Start: 2022-06-03 | End: 2022-09-26

## 2022-06-03 RX ORDER — SULFACETAMIDE SODIUM 100 MG/ML
1 SOLUTION/ DROPS OPHTHALMIC
Qty: 5 ML | Refills: 0 | Status: SHIPPED | OUTPATIENT
Start: 2022-06-03 | End: 2022-07-28 | Stop reason: SDUPTHER

## 2022-06-03 RX ORDER — SERTRALINE HYDROCHLORIDE 100 MG/1
100 TABLET, FILM COATED ORAL DAILY
Qty: 90 TABLET | Refills: 1 | Status: SHIPPED | OUTPATIENT
Start: 2022-06-03 | End: 2022-12-27

## 2022-06-03 NOTE — PROGRESS NOTES
Chief Complaint  Shoulder Pain (Establish new patient , right shoulder ) and Stye     Subjective:      History of Present Illness {CC  Problem List  Visit  Diagnosis   Encounters  Notes  Medications  Labs  Result Review Imaging  Media :23}     Stephanie Ferro presents to [facility] for:        She is a prior patient of DINESH Condon MD.   Dr Condon has now retired and she is here to establish care with me.      She  was last seen: 11/3/2021  Prior records have been reviewed.   She has multiple chronic medical conditions including: Depression/insomnia       Shoulder Injury   The right shoulder is affected. There was no injury mechanism. The quality of the pain is described as aching. The pain does not radiate. The pain is mild. Pertinent negatives include no muscle weakness, numbness or tingling. The symptoms are aggravated by overhead lifting. She has tried NSAIDs for the symptoms. The treatment provided mild relief.   Conjunctivitis   The current episode started 5 to 7 days ago. The problem has been gradually improving. Associated symptoms include eye redness. The left eye is affected.   She started warm compresses last 2 days after started weeping.        Plans on going to [place] for archeological dig this summer if not cancelled.     Depression: since 1987 after breakup.  She was in law school. Zoloft effective.         Answers for HPI/ROS submitted by the patient on 5/31/2022  Please describe your symptoms.: Shoulder pain  Have you had these symptoms before?: Yes  How long have you been having these symptoms?: 1-2 weeks  Please list any medications you are currently taking for this condition.: Ibuprofen without much relief  Please describe any probable cause for these symptoms. : Over exercise, arthritis  What is the primary reason for your visit?: Other    Retired     I have reviewed patient's medical history, any new submitted information provided by patient or  "medical assistant and updated medical record.      Objective:      Physical Exam  Vitals reviewed.   Constitutional:       Appearance: Normal appearance. She is well-developed.   HENT:      Head:      Comments: Wearing mask due to COVID   Eyes:      General:         Left eye: Hordeolum (lower ) present.     Conjunctiva/sclera:      Left eye: Left conjunctiva is injected.   Neck:      Thyroid: No thyromegaly.   Cardiovascular:      Rate and Rhythm: Normal rate and regular rhythm.      Pulses: Normal pulses.      Heart sounds: Normal heart sounds.   Pulmonary:      Effort: Pulmonary effort is normal.      Breath sounds: Normal breath sounds.      Comments: E/U   Musculoskeletal:      Right shoulder: Tenderness (anterior bursa ) present. No swelling or effusion. Decreased range of motion. Normal strength.   Skin:     General: Skin is warm and dry.      Capillary Refill: Capillary refill takes 2 to 3 seconds.   Neurological:      Mental Status: She is alert and oriented to person, place, and time.   Psychiatric:         Mood and Affect: Mood normal.         Behavior: Behavior normal. Behavior is cooperative.         Thought Content: Thought content normal.         Judgment: Judgment normal.        Result Review  Data Reviewed:{ Labs  Result Review  Imaging  Med Tab  Media :23}                Vital Signs:   /61 (BP Location: Left arm, Patient Position: Sitting, Cuff Size: Adult)   Temp 98.4 °F (36.9 °C) (Temporal)   Ht 157.5 cm (62\")   Wt 51.1 kg (112 lb 9.6 oz)   BMI 20.59 kg/m²         Requested Prescriptions     Signed Prescriptions Disp Refills   • meloxicam (Mobic) 15 MG tablet 30 tablet 0     Sig: Take 1 tablet by mouth Daily. Take with FOOD   • sulfacetamide (Bleph-10) 10 % ophthalmic solution 5 mL 0     Sig: Administer 1 drop into the left eye Every 3 (Three) Hours.       Routine medications provided by this office will also be refilled via pharmacy request.       Current Outpatient Medications:   • "  traZODone (DESYREL) 50 MG tablet, TAKE ONE-HALF TO ONE TABLET BY MOUTH EVERY NIGHT, Disp: 90 tablet, Rfl: 1  •  meloxicam (Mobic) 15 MG tablet, Take 1 tablet by mouth Daily. Take with FOOD, Disp: 30 tablet, Rfl: 0  •  sertraline (ZOLOFT) 100 MG tablet, Take 1 tablet by mouth Daily., Disp: 90 tablet, Rfl: 1  •  sulfacetamide (Bleph-10) 10 % ophthalmic solution, Administer 1 drop into the left eye Every 3 (Three) Hours., Disp: 5 mL, Rfl: 0     Assessment and Plan:      Assessment and Plan {CC Problem List  Visit Diagnosis  ROS  Review (Popup)  Health Maintenance  Quality  BestPractice  Medications  SmartSets  SnapShot Encounters  Media :23}     Problem List Items Addressed This Visit        Mental Health    Anxiety and depression (Chronic)    Overview     Since 1987            Current Assessment & Plan     Patient's depression is recurrent and is mild without psychosis. Their depression is currently in full remission and the condition is improving with treatment. This will be reassessed at the next regular appointment. F/U as described:patient will continue current medication therapy.              Sleep    Insomnia (Chronic)    Current Assessment & Plan     Continue trazodone as needed              Other Visit Diagnoses     Encounter to establish care    -  Primary    Encounter for screening mammogram for malignant neoplasm of breast        Relevant Orders    Mammo screening digital tomosynthesis bilateral w CAD    Hordeolum externum left lower eyelid        Warm compresses every 1-2 hours until improved.     Relevant Medications    sulfacetamide (Bleph-10) 10 % ophthalmic solution    Acute pain of right shoulder        ROM exercises and will refer to PT.     Relevant Medications    meloxicam (Mobic) 15 MG tablet    Other Relevant Orders    Ambulatory Referral to Physical Therapy Evaluate and treat; Full weight bearing          Follow Up {Instructions Charge Capture  Follow-up Communications :23}      Return in about 6 months (around 12/3/2022) for Medicare Wellness.      Patient was given instructions and counseling regarding her condition or for health maintenance advice. Please see specific information pulled into the AVS if appropriate.    [employer] disclaimer:   Much of this encounter note is an electronic transcription/translation of spoken language to printed text. The electronic translation of spoken language may permit erroneous, or at times, nonsensical words or phrases to be inadvertently transcribed; Although I have reviewed the note for such errors, some may still exist.     Additional Patient Counseling:       There are no Patient Instructions on file for this visit.

## 2022-06-15 ENCOUNTER — HOSPITAL ENCOUNTER (OUTPATIENT)
Dept: MAMMOGRAPHY | Facility: HOSPITAL | Age: 67
Discharge: HOME | End: 2022-06-15
Admitting: NURSE PRACTITIONER

## 2022-06-15 DIAGNOSIS — Z12.31 ENCOUNTER FOR SCREENING MAMMOGRAM FOR MALIGNANT NEOPLASM OF BREAST: ICD-10-CM

## 2022-06-15 PROCEDURE — 77063 BREAST TOMOSYNTHESIS BI: CPT

## 2022-06-15 PROCEDURE — 77067 SCR MAMMO BI INCL CAD: CPT

## 2022-06-16 ENCOUNTER — HOSPITAL ENCOUNTER (OUTPATIENT)
Dept: PHYSICAL THERAPY | Facility: HOSPITAL | Age: 67
Setting detail: THERAPIES SERIES
Discharge: HOME | End: 2022-06-16

## 2022-06-16 DIAGNOSIS — M25.511 ACUTE PAIN OF RIGHT SHOULDER: Primary | ICD-10-CM

## 2022-06-16 DIAGNOSIS — R29.898 SHOULDER WEAKNESS: ICD-10-CM

## 2022-06-16 PROCEDURE — 97110 THERAPEUTIC EXERCISES: CPT

## 2022-06-16 PROCEDURE — 97161 PT EVAL LOW COMPLEX 20 MIN: CPT

## 2022-06-16 NOTE — THERAPY EVALUATION
Outpatient Physical Therapy Ortho Initial Evaluation  [facility]     Patient Name: Stephanie Ferro  : 1955  MRN: [id number]  Today's Date: 2022      Visit Date: 2022    Patient Active Problem List   Diagnosis   • Osteoporosis   • Hyperlipidemia   • Anxiety and depression   • FH: premature coronary heart disease   • Colon cancer screening   • Insomnia        Past Medical History:   Diagnosis Date   • Anxiety and depression    • Hyperlipidemia    • Osteoporosis         Past Surgical History:   Procedure Laterality Date   • BREAST CYST ASPIRATION Left     27 yrs   • COLONOSCOPY     • COLONOSCOPY N/A 2018    Procedure: COLONOSCOPY into cecum and ti with cold biopsy polypectomies;  Surgeon: Asia Walters MD;  Location: [facility] ENDOSCOPY;  Service: Gastroenterology   • TONSILLECTOMY         Visit Dx:     ICD-10-CM ICD-9-CM   1. Acute pain of right shoulder  M25.511 719.41   2. Shoulder weakness  R29.898 719.61          Patient History     Row Name 22 1000             History    Chief Complaint Pain  -CC      Type of Pain Shoulder pain  -CC      Brief Description of Current Complaint Stephanie Ferro is a 66 y.o. female who presents today with R shoulder pain. Pt reports pain began about 1.5 to 2 months ago. Pt reports she goes to gym fairly regularly, but at this time had gone 4 days in a row and used heavier weights than usual, and did upper body most of those days, and noted pain following that time. Pt saw PCP and was prescribed Meloxicam, which helped some, but no longer takes it. Pt denies n/t or radicular s/s. Stephanie Ferro reports difficulty/increased pain with reaching overhead/flexion, donning/doffing shirt, blow drying hair, gardening. Pain relieving factors include Meloxicam, wall abduction slides. PMH includes hx of R mid back/scapular pain last year.  -CC      Patient/Caregiver Goals Relieve pain  -CC      Occupation/sports/leisure activities Retired; works out, reads,  gardening  -CC      What clinical tests have you had for this problem? X-ray  -CC      Results of Clinical Tests unremarkable (from a year ago)  -CC              Pain     Pain Location Shoulder  -CC      Pain at Present 4  -CC      Pain at Best 0  -CC      Pain at Worst 8  -CC      Is your sleep disturbed? Yes  -CC      What position do you sleep in? Right sidelying  -CC              Fall Risk Assessment    Any falls in the past year: No  -CC              Daily Activities    Primary Language English  -CC      Pt Participated in POC and Goals Yes  -CC            User Key  (r) = Recorded By, (t) = Taken By, (c) = Cosigned By    Initials Name Provider Type    CC Jacki Echeverria PT Physical Therapist                 PT Ortho     Row Name 06/16/22 1000       Posture/Observations    Posture- WNL Posture is WNL  -CC       Shoulder Girdle Accessory Motions    Posterior glide of humerus Right:;WNL  -CC    Inferior glide of humerus Right:;WNL  -CC       Shoulder Impingement/Rotator Cuff Special Tests    Meza-Zack Test (RC Lesion vs. Bursitis) Right:;Positive  -CC    Neer Impingement Test (RC Lesion vs. Bursitis) Right:;Positive  -CC    Empty Can Test (RC Lesion) Right:;Negative  -CC    Drop Arm Test (Full Thickness RC Lesion) Right:;Negative  -CC       Shoulder Girdle Palpation    Shoulder Girdle Palpation? No Abnormality/Tenderness  -CC       General ROM    RT Upper Ext Rt Shoulder Flexion;Rt Shoulder External Rotation;Rt Shoulder Internal Rotation  -CC    LT Upper Ext Lt Shoulder Flexion;Lt Shoulder External Rotation;Lt Shoulder Internal Rotation  -CC       Right Upper Ext    Rt Shoulder Flexion AROM 155, pain  -CC    Rt Shoulder Flexion PROM WNL, no pain  -CC    Rt Shoulder External Rotation AROM T3 NENITA  -CC    Rt Shoulder External Rotation PROM WNL, no pain  -CC    Rt Shoulder Internal Rotation AROM T7 FIR, pain  -CC    Rt Shoulder Internal Rotation PROM WNL, discomfort  -CC       Left Upper Ext    Lt Shoulder  Flexion AROM 160  -CC    Lt Shoulder External Rotation AROM T3 NENITA  -CC    Lt Shoulder Internal Rotation AROM T7 FIR  -CC       MMT (Manual Muscle Testing)    Rt Upper Ext Rt Shoulder Flexion;Rt Shoulder ABduction;Rt Shoulder Internal Rotation;Rt Shoulder External Rotation  -CC    Lt Upper Ext Lt Shoulder Flexion;Lt Shoulder ABduction;Lt Shoulder Internal Rotation;Lt Shoulder External Rotation  -CC       MMT Right Upper Ext    Rt Shoulder Flexion MMT, Gross Movement (4-/5) good minus  -CC    Rt Shoulder ABduction MMT, Gross Movement (4+/5) good plus  -CC    Rt Shoulder Internal Rotation MMT, Gross Movement (5/5) normal  -CC    Rt Shoulder External Rotation MMT, Gross Movement (3+/5) fair plus  -CC    Rt Upper Extremity Comments  pain with flex and ER  -CC       MMT Left Upper Ext    Lt Shoulder Flexion MMT, Gross Movement (5/5) normal  -CC    Lt Shoulder ABduction MMT, Gross Movement (5/5) normal  -CC    Lt Shoulder Internal Rotation MMT, Gross Movement (5/5) normal  -CC    Lt Shoulder External Rotation MMT, Gross Movement (4/5) good  -CC       Sensation    Sensation WNL? WNL  -CC          User Key  (r) = Recorded By, (t) = Taken By, (c) = Cosigned By    Initials Name Provider Type    CC Jacki Echeverria, PT Physical Therapist                            Therapy Education  Education Details: Access Code [email or web address]; eval findings, goals of PT, POC  Given: HEP, Symptoms/condition management, Pain management, Posture/body mechanics  Program: New  How Provided: Verbal, Demonstration, Written  Provided to: Patient  Level of Understanding: Teach back education performed, Verbalized, Demonstrated      PT OP Goals     Row Name 06/16/22 1000          PT Short Term Goals    STG Date to Achieve 07/16/22  -CC     STG 1 Pt will be independent and verbalized good understanding of initial HEP to improve ability to manage pain, as well as improve strength and ROM.  -CC     STG 1 Progress New  -CC     STG 2 Pt will report 0 sleep  disturbances related to shoulder pain to improve overall quality of life.  -     STG 2 Progress New  -     STG 3 Pt will improve R shoulder flexion to at least 160 deg without pain to improve ability to reach overhead.  -     STG 3 Progress New  [facility]            Long Term Goals    LTG Date to Achieve 08/15/22  -     LTG 1 Pt will be independent and verbalized good understanding of advanced HEP to improve ability to manage pain, as well as improve strength and ROM.  -     LTG 1 Progress New  -     LTG 2 Pt will improve DASH score to at least </=10% perceived disability to show overall improved quality of life.  -     LTG 2 Progress New  -     LTG 3 Pt will demo at least 4+/5 R shoulder strength to improve ability to participate in overhead ADLs.  -     LTG 3 Progress New  -     LTG 4 Pt will report return to regular gym routine without  </=2/10 R shoulder pain, as well as verbalize appropriate understanding of general strengthening principles including gradual progression of weight training, appropriate rest days, and appropriate form with upper body strengthening.  -     LTG 4 Progress New  [facility]            Time Calculation    PT Goal Re-Cert Due Date 09/14/22  -           User Key  (r) = Recorded By, (t) = Taken By, (c) = Cosigned By    Initials Name Provider Type    Jacki Huber, PT Physical Therapist                 PT Assessment/Plan     Row Name 06/16/22 1000          PT Assessment    Functional Limitations Limitation in home management;Limitations in community activities;Limitations in functional capacity and performance;Performance in leisure activities;Performance in self-care ADL;Performance in sport activities  -     Impairments Poor body mechanics;Pain;Muscle strength  -     Assessment Comments Stephanie Ferro is a 66 y.o. female referred to physical therapy for subacute R shoulder pain. She presents with a stable clinical presentation, along with no remarkable  comorbidities and personal factors of hx of R sided scapular pain about 1 year prior, active lifestyle that may impact her progress in the plan of care. Pt presents today with R shoulder AROM WFL but pain, R shoulder PROM WNL no pain, decreased R shoulder strength with flex and ER, and (+) HK and Neer's special testing . Her signs and symptoms are consistent with a physical therapy diagnosis of subacromial pain syndrome vs. RC/biceps tendonitis. The previous impairments limit her ability to reach overhead, reach behind back, sleep, and participate in workout activities/overhead ADLs. Pt will benefit from skilled PT to address the previous impairments and return to PLOF.  -CC     Please refer to paper survey for additional self-reported information Yes  -CC     Rehab Potential Excellent  -CC     Patient/caregiver participated in establishment of treatment plan and goals Yes  -CC     Patient would benefit from skilled therapy intervention Yes  -CC            PT Plan    PT Frequency 2x/week;1x/week  -CC     Predicted Duration of Therapy Intervention (PT) 6-8 visits over 4-8 weeks  -CC     Planned CPT's? PT EVAL LOW COMPLEXITY: 93379;PT RE-EVAL: 91382;PT THER PROC EA 15 MIN: 44111;PT THER ACT EA 15 MIN: 97565;PT MANUAL THERAPY EA 15 MIN: 00268;PT NEUROMUSC RE-EDUCATION EA 15 MIN: 50218;PT SELF CARE/HOME MGMT/TRAIN EA 15: 19392;PT HOT OR COLD PACK TREAT MCARE  -CC     PT Plan Comments next visit: pulleys for warm up, response to HEP?, add low row, tband shldr ext, serratus punch (elbow bent w/ tband in supine), s/l ER, assess tspine mobility. Pt works out in workout classes around 2-3d per week, long term goal to progress shoulder girdle/scapular strength, return to workout classes, and improve pt knowledge about strength training (i.e. form, gradual progression of resistance, off days for muscle recovery)  -CC           User Key  (r) = Recorded By, (t) = Taken By, (c) = Cosigned By    Initials Name Provider Type    CC  Jacki Echeverria, SINDI Physical Therapist                   OP Exercises     Row Name 06/16/22 1000             Subjective Comments    Subjective Comments Pt arrives at 10:05 for 1000 eval, unable to start until 10:07 due to check in  -CC              Total Minutes    62235 - PT Therapeutic Exercise Minutes 12  -CC              Exercise 1    Exercise Name 1 pulleys  -CC      Additional Comments next visit for warm up  -CC              Exercise 2    Exercise Name 2 supine HA  -CC      Cueing 2 Verbal  -CC      Sets 2 2  -CC      Reps 2 10  -CC      Time 2 RTB  -CC              Exercise 3    Exercise Name 3 supine B ER  -CC      Cueing 3 Verbal  -CC      Sets 3 2  -CC      Reps 3 10  -CC      Time 3 RTB  -CC              Exercise 4    Exercise Name 4 posterior shoulder roll  -CC      Cueing 4 Verbal  -CC      Reps 4 15  -CC      Additional Comments hands in lap  -CC              Exercise 5    Exercise Name 5 scap retract  -CC      Cueing 5 Verbal  -CC      Reps 5 15  -CC      Additional Comments hands in lap  -CC            User Key  (r) = Recorded By, (t) = Taken By, (c) = Cosigned By    Initials Name Provider Type    CC Jacki Echeverria, SINDI Physical Therapist                              Outcome Measure Options: Quick DASH  Quick DASH  Open a tight or new jar.: Mild Difficulty  Do heavy household chores (e.g., wash walls, wash floors): Moderate Difficulty  Carry a shopping bag or briefcase: Mild Difficulty  Wash your back: Mild Difficulty  Use a knife to cut food: No Difficulty  Recreational activities in which you take some force or impact through your arm, should or hand (e.g. golf, hammering, tennis, etc.): Severe Difficulty  During the past week, to what extent has your arm, shoulder, or hand problem interfered with your normal social activites with family, friends, neighbors or groups?: Not at all  During the past week, were you limited in your work or other regular daily activities as a result of your  arm, shoulder or hand problem?: Moderately Limited  Arm, Shoulder, or hand pain: Moderate  Tingling (pins and needles) in your arm, shoulder, or hand: None  During the past week, how much difficulty have you had sleeping because of the pain in your arm, shoulder or hand?: Mild Difficulty  Number of Questions Answered: 11  Quick DASH Score: 29.55         Time Calculation:     Start Time: 1007  Stop Time: 1043  Time Calculation (min): 36 min  Total Timed Code Minutes- PT: 12 minute(s)  Timed Charges  95310 - PT Therapeutic Exercise Minutes: 12  Untimed Charges  PT Eval/Re-eval Minutes: 24  Total Minutes  Timed Charges Total Minutes: 12  Untimed Charges Total Minutes: 24   Total Minutes: 36     Therapy Charges for Today     Code Description Service Date Service Provider Modifiers Qty    73161441813 HC PT THER PROC EA 15 MIN 6/16/2022 Jacki Echeverria, PT GP 1    85494157490 HC PT EVAL LOW COMPLEXITY 2 6/16/2022 Jacki Echeverria, PT GP 1          PT G-Codes  Outcome Measure Options: Quick DASH  Quick DASH Score: 29.55         Jacki Echeverria PT  6/16/2022

## 2022-06-29 ENCOUNTER — HOSPITAL ENCOUNTER (OUTPATIENT)
Dept: PHYSICAL THERAPY | Facility: HOSPITAL | Age: 67
Setting detail: THERAPIES SERIES
Discharge: HOME | End: 2022-06-29

## 2022-06-29 DIAGNOSIS — R29.898 SHOULDER WEAKNESS: ICD-10-CM

## 2022-06-29 DIAGNOSIS — M25.511 ACUTE PAIN OF RIGHT SHOULDER: Primary | ICD-10-CM

## 2022-06-29 PROCEDURE — 97110 THERAPEUTIC EXERCISES: CPT

## 2022-06-29 NOTE — THERAPY TREATMENT NOTE
Outpatient Physical Therapy Ortho Treatment Note  [facility]     Patient Name: Stephanie Ferro  : 1955  MRN: [id number]  Today's Date: 2022      Visit Date: 2022    Visit Dx:    ICD-10-CM ICD-9-CM   1. Acute pain of right shoulder  M25.511 719.41   2. Shoulder weakness  R29.898 719.61       Patient Active Problem List   Diagnosis   • Osteoporosis   • Hyperlipidemia   • Anxiety and depression   • FH: premature coronary heart disease   • Colon cancer screening   • Insomnia        Past Medical History:   Diagnosis Date   • Anxiety and depression    • Hyperlipidemia    • Osteoporosis         Past Surgical History:   Procedure Laterality Date   • BREAST CYST ASPIRATION Left     27 yrs   • COLONOSCOPY     • COLONOSCOPY N/A 2018    Procedure: COLONOSCOPY into cecum and ti with cold biopsy polypectomies;  Surgeon: Asia Walters MD;  Location: [facility] ENDOSCOPY;  Service: Gastroenterology   • TONSILLECTOMY                          PT Assessment/Plan     Row Name 22 1600          PT Assessment    Assessment Comments Pt returns for first follow up apt since her evaluation. Pt voiced her shoulder symptoms are still the same. Pt was educated on optimal mechanics with her scapula when performing most therapeutic exercises in order to better manage her symptoms. Pt required VC/TCs throughout session to better engage her scapular stabalizing muscles. PT progressed pt's HEP and educated pt on DOMS and to ice prn. Pt would continue to benefit from skilled therapeutic intervention.  -DC            PT Plan    PT Plan Comments Reasses HEP and pt's symptoms; Increase resistance to therapeutic exercises as needed.  -DC           User Key  (r) = Recorded By, (t) = Taken By, (c) = Cosigned By    Initials Name Provider Type    DC Chente Leggett, PT Physical Therapist                   OP Exercises     Row Name 22 1400             Subjective Comments    Subjective Comments Pt reports no change in R  "shoulder. Pt voiced after making her bed and picking objects the floor she felt a \"twinge\" from her L shoulder blade to L pelvis.  -DC              Total Minutes    86625 - PT Therapeutic Exercise Minutes 42  -DC              Exercise 1    Exercise Name 1 pulleys  -DC      Time 1 2 min  -DC              Exercise 2    Exercise Name 2 supine HA  -DC      Cueing 2 Verbal;Demo  -DC      Sets 2 2  -DC      Reps 2 12  -DC      Time 2 RTB  -DC              Exercise 3    Exercise Name 3 Sitting B ER  -DC      Cueing 3 Verbal;Tactile;Demo  -DC      Sets 3 3  -DC      Reps 3 15  -DC      Time 3 RTB  -DC      Additional Comments Cuing on ERs of RC  -DC              Exercise 5    Exercise Name 5 scap retract  -DC      Cueing 5 Verbal;Demo  -DC      Reps 5 15  -DC      Additional Comments hands in lap  -DC              Exercise 6    Exercise Name 6 UBE  -DC      Time 6 4  -DC      Additional Comments Fwd only, bkwd was painful  -DC              Exercise 7    Exercise Name 7 Supine D2 flex  -DC      Sets 7 2  -DC      Reps 7 10  -DC      Additional Comments Cuing on scapular control  -DC              Exercise 8    Exercise Name 8 Supine SA punches  -DC      Sets 8 3  -DC      Reps 8 15  -DC      Additional Comments 4#; Cuing on scapular control and mechanics  -DC              Exercise 9    Exercise Name 9 Quadruped threading the needle  -DC      Reps 9 10B  -DC      Time 9 5 sec hold  -DC            User Key  (r) = Recorded By, (t) = Taken By, (c) = Cosigned By    Initials Name Provider Type    DC Chente Leggett, PT Physical Therapist                              PT OP Goals     Row Name 06/29/22 1600          PT Short Term Goals    STG Date to Achieve 07/16/22  -DC     STG 1 Pt will be independent and verbalized good understanding of initial HEP to improve ability to manage pain, as well as improve strength and ROM.  -DC     STG 1 Progress Ongoing  -DC     STG 2 Pt will report 0 sleep disturbances related to shoulder pain to " improve overall quality of life.  -DC     STG 2 Progress Ongoing  -DC     STG 3 Pt will improve R shoulder flexion to at least 160 deg without pain to improve ability to reach overhead.  -DC     STG 3 Progress Ongoing  -DC            Long Term Goals    LTG Date to Achieve 08/15/22  -DC     LTG 1 Pt will be independent and verbalized good understanding of advanced HEP to improve ability to manage pain, as well as improve strength and ROM.  -DC     LTG 1 Progress Ongoing  -DC     LTG 2 Pt will improve DASH score to at least </=10% perceived disability to show overall improved quality of life.  -DC     LTG 2 Progress Ongoing  -DC     LTG 3 Pt will demo at least 4+/5 R shoulder strength to improve ability to participate in overhead ADLs.  -DC     LTG 3 Progress Ongoing  -DC     LTG 4 Pt will report return to regular gym routine without  </=2/10 R shoulder pain, as well as verbalize appropriate understanding of general strengthening principles including gradual progression of weight training, appropriate rest days, and appropriate form with upper body strengthening.  -DC     LTG 4 Progress Ongoing  -DC           User Key  (r) = Recorded By, (t) = Taken By, (c) = Cosigned By    Initials Name Provider Type    Chente Corrales, PT Physical Therapist                Therapy Education  Education Details: Pt educated on improving scapular stabalizing mechanics. Pt educated on DOMS.  Given: HEP, Symptoms/condition management, Pain management, Posture/body mechanics  Program: Reinforced, Progressed  How Provided: Verbal, Demonstration, Written  Provided to: Patient  Level of Understanding: Teach back education performed, Verbalized, Demonstrated              Time Calculation:   Start Time: 1400  Stop Time: 1446  Time Calculation (min): 46 min  Total Timed Code Minutes- PT: 42 minute(s)  Timed Charges  55232 - PT Therapeutic Exercise Minutes: 42  Total Minutes  Timed Charges Total Minutes: 42   Total Minutes: 42  Therapy  Charges for Today     Code Description Service Date Service Provider Modifiers Qty    21752982614 HC PT THER PROC EA 15 MIN 6/29/2022 Chente Leggett, PT GP 3                    Chente Leggett, PT  6/29/2022

## 2022-07-06 ENCOUNTER — HOSPITAL ENCOUNTER (OUTPATIENT)
Dept: PHYSICAL THERAPY | Facility: HOSPITAL | Age: 67
Setting detail: THERAPIES SERIES
Discharge: HOME | End: 2022-07-06

## 2022-07-06 DIAGNOSIS — M25.511 ACUTE PAIN OF RIGHT SHOULDER: Primary | ICD-10-CM

## 2022-07-06 DIAGNOSIS — R29.898 SHOULDER WEAKNESS: ICD-10-CM

## 2022-07-06 PROCEDURE — 97110 THERAPEUTIC EXERCISES: CPT

## 2022-07-06 NOTE — THERAPY TREATMENT NOTE
Outpatient Physical Therapy Ortho Treatment Note  [facility]     Patient Name: Stephanie Ferro  : 1955  MRN: [id number]  Today's Date: 2022      Visit Date: 2022    Visit Dx:    ICD-10-CM ICD-9-CM   1. Acute pain of right shoulder  M25.511 719.41   2. Shoulder weakness  R29.898 719.61       Patient Active Problem List   Diagnosis   • Osteoporosis   • Hyperlipidemia   • Anxiety and depression   • FH: premature coronary heart disease   • Colon cancer screening   • Insomnia        Past Medical History:   Diagnosis Date   • Anxiety and depression    • Hyperlipidemia    • Osteoporosis         Past Surgical History:   Procedure Laterality Date   • BREAST CYST ASPIRATION Left     27 yrs   • COLONOSCOPY     • COLONOSCOPY N/A 2018    Procedure: COLONOSCOPY into cecum and ti with cold biopsy polypectomies;  Surgeon: Asia Walters MD;  Location: [facility] ENDOSCOPY;  Service: Gastroenterology   • TONSILLECTOMY                          PT Assessment/Plan     Row Name 22 1000          PT Assessment    Assessment Comments Pt voiced having more fequent R shoulder pain after previous session and unable to correlate to exact reason. Pt mentioned being more aware of her shoulder and posture. Pt had some mild pain symptoms in R shoulder with UBE, diminished with pulleys. Pt continues to require VC/TCs when performing scapular strengthening exercises. Pt educated on optimal mechanics and muscular engagement with specific exercises, especially with shoulder ER strengthening. Pt would continue to benefit from skilled therapeutic intervention.  -DC            PT Plan    PT Plan Comments Reassess HEP and symptoms; Incorporate Prone Y if symptoms tolerate  -DC           User Key  (r) = Recorded By, (t) = Taken By, (c) = Cosigned By    Initials Name Provider Type    DC Chente Leggett, PT Physical Therapist                   OP Exercises     Row Name 22 1000             Subjective Comments     Subjective Comments Pt reports her R shoulder pain has become more frequent. Pt was sore in her R shoulder after last visit, icing helped calm her symptoms.  -DC              Total Minutes    12754 - PT Therapeutic Exercise Minutes 41  -DC              Exercise 1    Exercise Name 1 pulleys  -DC      Time 1 2 min  -DC              Exercise 2    Exercise Name 2 supine HA  -DC      Cueing 2 Verbal;Demo  -DC      Sets 2 2  -DC      Reps 2 12  -DC      Time 2 RTB  -DC              Exercise 3    Exercise Name 3 Sitting B ER  -DC      Cueing 3 Verbal;Tactile;Demo  -DC      Sets 3 3  -DC      Reps 3 15  -DC      Time 3 RTB  -DC      Additional Comments Cuing RC and not biceps performing the movement. Cuing on control when returning back to starting position  -DC              Exercise 5    Exercise Name 5 scap retract  -DC      Cueing 5 Verbal;Demo  -DC      Reps 5 20  -DC      Additional Comments Cuing on scapular control  -DC              Exercise 6    Exercise Name 6 UBE  -DC      Time 6 4  -DC              Exercise 7    Exercise Name 7 Supine D2 flex  -DC      Cueing 7 Verbal;Tactile  -DC      Sets 7 2  -DC      Reps 7 12  -DC      Additional Comments Cuing on scapular control  -DC              Exercise 8    Exercise Name 8 Supine SA punches  -DC      Sets 8 3  -DC      Reps 8 15  -DC      Additional Comments 4#; Cuing on scapular control and mechanics  -DC              Exercise 9    Exercise Name 9 Quadruped threading the needle  -DC      Reps 9 10B  -DC      Time 9 5 sec hold  -DC            User Key  (r) = Recorded By, (t) = Taken By, (c) = Cosigned By    Initials Name Provider Type    DC Chente Leggett, PT Physical Therapist                              PT OP Goals     Row Name 07/06/22 1000          PT Short Term Goals    STG Date to Achieve 07/16/22  -DC     STG 1 Pt will be independent and verbalized good understanding of initial HEP to improve ability to manage pain, as well as improve strength and ROM.  -DC      STG 1 Progress Ongoing  -DC     STG 2 Pt will report 0 sleep disturbances related to shoulder pain to improve overall quality of life.  -DC     STG 2 Progress Ongoing  -DC     STG 3 Pt will improve R shoulder flexion to at least 160 deg without pain to improve ability to reach overhead.  -DC     STG 3 Progress Ongoing  -DC            Long Term Goals    LTG Date to Achieve 08/15/22  -DC     LTG 1 Pt will be independent and verbalized good understanding of advanced HEP to improve ability to manage pain, as well as improve strength and ROM.  -DC     LTG 1 Progress Ongoing  -DC     LTG 2 Pt will improve DASH score to at least </=10% perceived disability to show overall improved quality of life.  -DC     LTG 2 Progress Ongoing  -DC     LTG 3 Pt will demo at least 4+/5 R shoulder strength to improve ability to participate in overhead ADLs.  -DC     LTG 3 Progress Ongoing  -DC     LTG 4 Pt will report return to regular gym routine without  </=2/10 R shoulder pain, as well as verbalize appropriate understanding of general strengthening principles including gradual progression of weight training, appropriate rest days, and appropriate form with upper body strengthening.  -DC     LTG 4 Progress Ongoing  -DC           User Key  (r) = Recorded By, (t) = Taken By, (c) = Cosigned By    Initials Name Provider Type    Chente Corrales, PT Physical Therapist                Therapy Education  Given: HEP, Symptoms/condition management, Pain management, Posture/body mechanics  Program: Reinforced  How Provided: Verbal, Demonstration, Written  Provided to: Patient  Level of Understanding: Teach back education performed, Verbalized, Demonstrated              Time Calculation:   Start Time: 1046  Stop Time: 1129  Time Calculation (min): 43 min  Total Timed Code Minutes- PT: 41 minute(s)  Timed Charges  50595 - PT Therapeutic Exercise Minutes: 41  Total Minutes  Timed Charges Total Minutes: 41   Total Minutes: 41  Therapy Charges for  Today     Code Description Service Date Service Provider Modifiers Qty    70132547629 HC PT THER PROC EA 15 MIN 7/6/2022 Chente Leggett, PT GP 3                    Chente Leggett, PT  7/6/2022

## 2022-07-08 ENCOUNTER — HOSPITAL ENCOUNTER (OUTPATIENT)
Dept: PHYSICAL THERAPY | Facility: HOSPITAL | Age: 67
Setting detail: THERAPIES SERIES
Discharge: HOME | End: 2022-07-08

## 2022-07-08 DIAGNOSIS — M25.511 ACUTE PAIN OF RIGHT SHOULDER: Primary | ICD-10-CM

## 2022-07-08 DIAGNOSIS — R29.898 SHOULDER WEAKNESS: ICD-10-CM

## 2022-07-08 PROCEDURE — 97110 THERAPEUTIC EXERCISES: CPT | Performed by: PHYSICAL THERAPIST

## 2022-07-08 NOTE — THERAPY TREATMENT NOTE
Outpatient Physical Therapy Ortho Treatment Note  [facility]     Patient Name: Stephanie Ferro  : 1955  MRN: [id number]  Today's Date: 2022      Visit Date: 2022    Visit Dx:    ICD-10-CM ICD-9-CM   1. Acute pain of right shoulder  M25.511 719.41   2. Shoulder weakness  R29.898 719.61       Patient Active Problem List   Diagnosis   • Osteoporosis   • Hyperlipidemia   • Anxiety and depression   • FH: premature coronary heart disease   • Colon cancer screening   • Insomnia        Past Medical History:   Diagnosis Date   • Anxiety and depression    • Hyperlipidemia    • Osteoporosis         Past Surgical History:   Procedure Laterality Date   • BREAST CYST ASPIRATION Left     27 yrs   • COLONOSCOPY     • COLONOSCOPY N/A 2018    Procedure: COLONOSCOPY into cecum and ti with cold biopsy polypectomies;  Surgeon: Asia Walters MD;  Location: [facility] ENDOSCOPY;  Service: Gastroenterology   • TONSILLECTOMY          PT Ortho     Row Name 22 1400       General ROM    RT Upper Ext Rt Shoulder ABduction  -GJ       Right Upper Ext    Rt Shoulder Abduction AROM 155 seated, no pain  -GJ    Rt Shoulder Flexion AROM 160, seated, no pain, good rhythm  -GJ          User Key  (r) = Recorded By, (t) = Taken By, (c) = Cosigned By    Initials Name Provider Type    Georges Maloney, PT Physical Therapist                             PT Assessment/Plan     Row Name 22 1512          PT Assessment    Assessment Comments Ms. Ferro returns to the clinic, reporting overall improvement in her condition.  She reports not being woken from R shoulder pain. She has met 3 of 3 STG's and is progressing toward all remaining goals.  We did progress her program somewhat today adding resistance to 2 exercises and adding prone Y.  Did not issue for home today given her previous irritability with exercises and wanting to ensure good response. She reported no increase in R shoulder pain in the clinic. See ortho for  "ROM. Ms. Ferro continues to be a good candidate for skilled  physical therapy.  -GJ            PT Plan    PT Plan Comments assess response to new exercises from previuous session, if good response, update HEP. If she is doing well, consider SL shoulder flexion, SL shoulder abd  -GJ           User Key  (r) = Recorded By, (t) = Taken By, (c) = Cosigned By    Initials Name Provider Type     Georges Vallejo, PT Physical Therapist                   OP Exercises     Row Name 07/08/22 1447 07/08/22 1400          Subjective Comments    Subjective Comments -- I feel like I\"m getting better, I feel like I'm more aware of my posture  -GJ            Total Minutes    60751 - PT Therapeutic Exercise Minutes 40  -GJ --            Exercise 1    Exercise Name 1 -- pulleys  -GJ     Time 1 -- 3 min  -GJ            Exercise 2    Exercise Name 2 -- supine HA  -GJ     Cueing 2 -- Verbal;Demo  -GJ     Sets 2 -- 2  -GJ     Reps 2 -- 12  -GJ     Time 2 -- RTB  -GJ            Exercise 3    Exercise Name 3 -- Sitting B ER  -GJ     Cueing 3 -- Verbal;Tactile;Demo  -GJ     Sets 3 -- 3  -GJ     Reps 3 -- 15  -GJ     Time 3 -- RTB  -GJ            Exercise 5    Exercise Name 5 -- scap retract, standing  -GJ     Cueing 5 -- Verbal;Demo  -GJ     Reps 5 -- 20  -GJ     Time 5 -- zrtb  -GJ     Additional Comments -- Cuing on scapular control  -GJ            Exercise 6    Exercise Name 6 -- UBE  -GJ     Time 6 -- 4  -GJ     Additional Comments -- forward  -GJ            Exercise 7    Exercise Name 7 -- Supine D2 flex  -GJ     Cueing 7 -- Verbal;Tactile  -GJ     Sets 7 -- 2  -GJ     Reps 7 -- 12  -GJ     Time 7 -- RTB, thumb up  -GJ            Exercise 8    Exercise Name 8 -- Supine SA punches  -GJ     Cueing 8 -- Verbal;Demo  -GJ     Sets 8 -- 3  -GJ     Reps 8 -- 15  -GJ     Additional Comments -- 4#; Cuing on scapular control and mechanics  -GJ            Exercise 9    Exercise Name 9 -- Quadruped threading the needle  -GJ     Cueing 9 -- " Verbal;Demo  -GJ     Reps 9 -- 10B  -GJ     Time 9 -- 5 sec hold  -GJ            Exercise 10    Exercise Name 10 -- prone Y, unilateral (arm starting off table,, flexed to 90)  -GJ     Cueing 10 -- Verbal;Tactile;Demo  -GJ     Sets 10 -- 2  -GJ     Reps 10 -- 15  -GJ     Time 10 -- 0#  -GJ     Additional Comments -- ? add 1# next session  -GJ           User Key  (r) = Recorded By, (t) = Taken By, (c) = Cosigned By    Initials Name Provider Type    Georges Maloney, PT Physical Therapist                              PT OP Goals     Row Name 07/08/22 1400          PT Short Term Goals    STG Date to Achieve 07/16/22  -GJ     STG 1 Pt will be independent and verbalized good understanding of initial HEP to improve ability to manage pain, as well as improve strength and ROM.  -GJ     STG 1 Progress Met  -GJ     STG 2 Pt will report 0 sleep disturbances related to shoulder pain to improve overall quality of life.  -GJ     STG 2 Progress Met  -GJ     STG 2 Progress Comments pt reports no sleep disturbance,  -GJ     STG 3 Pt will improve R shoulder flexion to at least 160 deg without pain to improve ability to reach overhead.  -GJ     STG 3 Progress Met  -GJ     STG 3 Progress Comments R shoulder AROM flexion 160, no pain  -GJ            Long Term Goals    LTG Date to Achieve 08/15/22  -GJ     LTG 1 Pt will be independent and verbalized good understanding of advanced HEP to improve ability to manage pain, as well as improve strength and ROM.  -GJ     LTG 1 Progress Ongoing  -GJ     LTG 2 Pt will improve DASH score to at least </=10% perceived disability to show overall improved quality of life.  -GJ     LTG 2 Progress Ongoing  -GJ     LTG 3 Pt will demo at least 4+/5 R shoulder strength to improve ability to participate in overhead ADLs.  -GJ     LTG 3 Progress Ongoing  -GJ     LTG 4 Pt will report return to regular gym routine without  </=2/10 R shoulder pain, as well as verbalize appropriate understanding of general  strengthening principles including gradual progression of weight training, appropriate rest days, and appropriate form with upper body strengthening.  -ROSEMARIE     LTG 4 Progress Ongoing  -GJ           User Key  (r) = Recorded By, (t) = Taken By, (c) = Cosigned By    Initials Name Provider Type    Georges Maloney, PT Physical Therapist                Therapy Education  Education Details: did not issue new exercises for home today, if she does well, plan to update HEP at that time. Reviewed physiology of healing and realistic expectations in terms of time frame/outcomes  Given: HEP, Symptoms/condition management, Pain management, Posture/body mechanics, Mobility training  Program: Reinforced, New, Progressed  How Provided: Verbal, Demonstration  Provided to: Patient  Level of Understanding: Teach back education performed, Verbalized, Demonstrated              Time Calculation:   Start Time: 1447  Stop Time: 1528  Time Calculation (min): 41 min  Timed Charges  14182 - PT Therapeutic Exercise Minutes: 40  Total Minutes  Timed Charges Total Minutes: 40   Total Minutes: 40  Therapy Charges for Today     Code Description Service Date Service Provider Modifiers Qty    25871989499 HC PT THER PROC EA 15 MIN 7/8/2022 Georges Vallejo, PT GP 3                    Georges Vallejo, PT  7/8/2022

## 2022-07-13 ENCOUNTER — HOSPITAL ENCOUNTER (OUTPATIENT)
Dept: PHYSICAL THERAPY | Facility: HOSPITAL | Age: 67
Setting detail: THERAPIES SERIES
Discharge: HOME | End: 2022-07-13

## 2022-07-13 DIAGNOSIS — M25.511 ACUTE PAIN OF RIGHT SHOULDER: Primary | ICD-10-CM

## 2022-07-13 DIAGNOSIS — R29.898 SHOULDER WEAKNESS: ICD-10-CM

## 2022-07-13 PROCEDURE — 97110 THERAPEUTIC EXERCISES: CPT

## 2022-07-13 NOTE — THERAPY TREATMENT NOTE
"    Outpatient Physical Therapy Ortho Treatment Note  [facility]     Patient Name: Stephanie Ferro  : 1955  MRN: [id number]  Today's Date: 2022      Visit Date: 2022    Visit Dx:    ICD-10-CM ICD-9-CM   1. Acute pain of right shoulder  M25.511 719.41   2. Shoulder weakness  R29.898 719.61       Patient Active Problem List   Diagnosis   • Osteoporosis   • Hyperlipidemia   • Anxiety and depression   • FH: premature coronary heart disease   • Colon cancer screening   • Insomnia        Past Medical History:   Diagnosis Date   • Anxiety and depression    • Hyperlipidemia    • Osteoporosis         Past Surgical History:   Procedure Laterality Date   • BREAST CYST ASPIRATION Left     27 yrs   • COLONOSCOPY     • COLONOSCOPY N/A 2018    Procedure: COLONOSCOPY into cecum and ti with cold biopsy polypectomies;  Surgeon: Asia Walters MD;  Location: [facility] ENDOSCOPY;  Service: Gastroenterology   • TONSILLECTOMY                          PT Assessment/Plan     Row Name 22 1100          PT Assessment    Assessment Comments Pt returns reporting some improvement in s/s since start of therapy, but still has pain in R shoulder when reaching overhead. Focus today on scapular endurance/strengthening interventions. Pt demos significant scapular weakness and tends to fatigue quickly with endurance challenges, requiring frequent cues for form.  -CC            PT Plan    PT Plan Comments Consider adding s/l flex, cont to work on form with I/T/Y and add to HEP once appropriate. May consider Y wall slide with lift off for lower trap.  -CC           User Key  (r) = Recorded By, (t) = Taken By, (c) = Cosigned By    Initials Name Provider Type    CC Jacki Echeverria, PT Physical Therapist                   OP Exercises     Row Name 22 1000             Subjective Comments    Subjective Comments \"I can still feel it.\"  -CC              Total Minutes    40614 - PT Therapeutic Exercise Minutes 39  -CC   " "           Exercise 1    Exercise Name 1 pulleys  -CC      Time 1 3 min  -CC              Exercise 2    Exercise Name 2 Lateral wall taps (UE WB at wall with shldr horiz abduc)  -CC      Cueing 2 Verbal;Demo  -CC      Sets 2 2  -CC      Reps 2 20 total, alternting sides  -CC      Time 2 RTB looped  -CC      Additional Comments cues for SA \"push wall away\" but pt fatigues quickly  -CC              Exercise 3    Exercise Name 3 standing B ER  -CC      Cueing 3 Verbal;Tactile;Demo  -CC      Sets 3 3  -CC      Reps 3 10  -CC      Time 3 RTB  -CC              Exercise 4    Exercise Name 4 shldr flex with SA/looped band  -CC      Cueing 4 Verbal;Tactile;Demo  -CC      Sets 4 2  -CC      Reps 4 10  -CC      Time 4 YTB looped, slight bend in elbow, pain free range  -CC              Exercise 5    Exercise Name 5 scap retract, standing  -CC      Cueing 5 Verbal;Demo  -CC      Reps 5 20  -CC      Time 5 RTB  -CC      Additional Comments Cuing on scapular control  -CC              Exercise 6    Exercise Name 6 UBE  -CC      Time 6 4  -CC              Exercise 7    Exercise Name 7 standing D2 flex  -CC      Cueing 7 Verbal;Demo  -CC      Sets 7 2R  -CC      Reps 7 10  -CC      Time 7 YTB  -CC      Additional Comments cues for scap/shldr squeeze at top  -CC              Exercise 9    Exercise Name 9 Quadruped threading the needle  -CC      Cueing 9 Verbal;Demo  -CC      Reps 9 10B  -CC      Time 9 5 sec hold  -CC              Exercise 10    Exercise Name 10 SB I, T, Y  -CC      Cueing 10 Verbal;Tactile;Demo  -CC      Reps 10 10 I and T, 5 Y  -CC      Time 10 5 sec  -CC      Additional Comments significant cues for scap engagement and arm positioning, pt fatigues quickly  -CC            User Key  (r) = Recorded By, (t) = Taken By, (c) = Cosigned By    Initials Name Provider Type    Jacki Huber PT Physical Therapist                                                Time Calculation:   Start Time: 1005  Stop Time: " 1044  Time Calculation (min): 39 min  Total Timed Code Minutes- PT: 39 minute(s)  Timed Charges  70945 - PT Therapeutic Exercise Minutes: 39  Total Minutes  Timed Charges Total Minutes: 39   Total Minutes: 39  Therapy Charges for Today     Code Description Service Date Service Provider Modifiers Qty    23919979061 HC PT THER PROC EA 15 MIN 7/13/2022 Jacki Echeverria, PT GP 3                    Jacki Echeverria, PT  7/13/2022

## 2022-07-15 ENCOUNTER — HOSPITAL ENCOUNTER (OUTPATIENT)
Dept: PHYSICAL THERAPY | Facility: HOSPITAL | Age: 67
Setting detail: THERAPIES SERIES
Discharge: HOME | End: 2022-07-15

## 2022-07-15 DIAGNOSIS — M25.511 ACUTE PAIN OF RIGHT SHOULDER: Primary | ICD-10-CM

## 2022-07-15 DIAGNOSIS — R29.898 SHOULDER WEAKNESS: ICD-10-CM

## 2022-07-15 PROCEDURE — 97110 THERAPEUTIC EXERCISES: CPT | Performed by: PHYSICAL THERAPIST

## 2022-07-15 NOTE — THERAPY PROGRESS REPORT/RE-CERT
Outpatient Physical Therapy Ortho Progress Note  [facility]     Patient Name: Stephanie Ferro  : 1955  MRN: [id number]  Today's Date: 7/15/2022      Visit Date: 07/15/2022    Visit Dx:    ICD-10-CM ICD-9-CM   1. Acute pain of right shoulder  M25.511 719.41   2. Shoulder weakness  R29.898 719.61       Patient Active Problem List   Diagnosis   • Osteoporosis   • Hyperlipidemia   • Anxiety and depression   • FH: premature coronary heart disease   • Colon cancer screening   • Insomnia        Past Medical History:   Diagnosis Date   • Anxiety and depression    • Hyperlipidemia    • Osteoporosis         Past Surgical History:   Procedure Laterality Date   • BREAST CYST ASPIRATION Left     27 yrs   • COLONOSCOPY     • COLONOSCOPY N/A 2018    Procedure: COLONOSCOPY into cecum and ti with cold biopsy polypectomies;  Surgeon: Asia Walters MD;  Location: [facility] ENDOSCOPY;  Service: Gastroenterology   • TONSILLECTOMY          PT Ortho     Row Name 07/15/22 1000       Shoulder Impingement/Rotator Cuff Special Tests    Meza-Zack Test (RC Lesion vs. Bursitis) Right:;Positive  -GJ    Full Can Test (RC Lesion) Right:;Negative  -GJ    Empty Can Test (RC Lesion) Right:;Negative  -GJ    Speed's Test (LH of Biceps Lesion) Right:  mild pain  -GJ       MMT Right Upper Ext    Rt Shoulder Flexion MMT, Gross Movement (4+/5) good plus  mild pain  -GJ    Rt Shoulder ABduction MMT, Gross Movement (4+/5) good plus  mild pain  -GJ    Rt Shoulder Internal Rotation MMT, Gross Movement (5/5) normal  -GJ    Rt Shoulder External Rotation MMT, Gross Movement (4/5) good  -GJ          User Key  (r) = Recorded By, (t) = Taken By, (c) = Cosigned By    Initials Name Provider Type    Georges Maloney, PT Physical Therapist                             PT Assessment/Plan     Row Name 07/15/22 1041          PT Assessment    Functional Limitations Limitation in home management;Limitations in community activities;Performance in  leisure activities  -GJ     Impairments Impaired muscle endurance;Impaired muscle power;Impaired postural alignment;Muscle strength;Pain;Poor body mechanics;Posture;Range of motion  -GJ     Assessment Comments Ms. Ferro is a 65 y/o female. She has attended 6 sessions of physical therapy for her R shoulder pain.  She reports about 20-30% improvement in her condition, with continued pain while reaching/lifting a load (coffee cup etc). She denies sleep disturbance secondary to R shoulder pain.  Progress toward functional goals is good, having met 3 of 3 STG's and partially met 1 of 4 LTGs. See ortho section for ROM/MMT/special testing. She demonstrates improved R shoulder AROM with improved rhythm, improving muscle strength, continued (+) impingement testing.  She demosntrated R scapular girdle/RC fatigue throughout today's shorteed session.  Today's session was shortened secondary to pt's time constraints.  Ms. Ferro continues to be a goood candidate for skilled physical therapy.  -GJ     Please refer to paper survey for additional self-reported information Yes  -GJ     Rehab Potential Good  -GJ     Patient/caregiver participated in establishment of treatment plan and goals Yes  -GJ     Patient would benefit from skilled therapy intervention Yes  -GJ            PT Plan    PT Frequency 1x/week;2x/week  -GJ     Predicted Duration of Therapy Intervention (PT) 10 visits  -GJ     Planned CPT's? PT RE-EVAL: 84248;PT THER PROC EA 15 MIN: 41154;PT THER ACT EA 15 MIN: 47477;PT MANUAL THERAPY EA 15 MIN: 81435;PT NEUROMUSC RE-EDUCATION EA 15 MIN: 31809;PT HOT OR COLD PACK TREAT MCARE  -GJ     PT Plan Comments assess response to pulling strengthening back to once a week, continue to work on scapular girdle/RC strengtheninig/endurance, update HEP as appropriate (did not do last time secondary to pt time constraints).  ? endurance activities like alphabet on wall, 's carry?  -GJ           User Key  (r) = Recorded By,  (t) = Taken By, (c) = Cosigned By    Initials Name Provider Type    Georges Maloney, PT Physical Therapist                   OP Exercises     Row Name 07/15/22 1009 07/15/22 1000          Subjective Comments    Subjective Comments -- I'm doing ok, not pain free, I still have frequent pain, I'd say I'm about 20-30% improved. I felt good on Thursday morning after being liliane on Wed. Still having trouble with reaching out and picking things up  -GJ            Total Minutes    59539 - PT Therapeutic Exercise Minutes 27  -GJ --            Exercise 2    Exercise Name 2 -- Lateral wall taps (UE WB at wall with shldr horiz abduc)  -GJ     Cueing 2 -- Verbal;Demo  -GJ     Sets 2 -- 2  -GJ     Reps 2 -- 20 total, alternting sides  -GJ     Time 2 -- YTB looped  -GJ            Exercise 3    Exercise Name 3 -- standing B ER  -GJ     Cueing 3 -- Verbal;Tactile;Demo  -GJ     Sets 3 -- 3  -GJ     Reps 3 -- 10  -GJ     Time 3 -- RTB  -GJ            Exercise 4    Exercise Name 4 -- shldr flex with SA/looped band  -GJ     Cueing 4 -- Verbal;Tactile;Demo  -GJ     Sets 4 -- 2  -GJ     Reps 4 -- 10  -GJ     Time 4 -- YTB looped, slight bend in elbow, pain free range  -GJ            Exercise 6    Exercise Name 6 -- UBE  -GJ     Time 6 -- 3 min  -GJ            Exercise 7    Exercise Name 7 -- standing D2 flex  -GJ     Cueing 7 -- Verbal;Demo  -GJ     Sets 7 -- 2R  -GJ     Reps 7 -- 10  -GJ     Time 7 -- YTB  -GJ           User Key  (r) = Recorded By, (t) = Taken By, (c) = Cosigned By    Initials Name Provider Type    Georges Maloney, PT Physical Therapist                              PT OP Goals     Row Name 07/15/22 1000          PT Short Term Goals    STG Date to Achieve 07/16/22  -GJ     STG 1 Pt will be independent and verbalized good understanding of initial HEP to improve ability to manage pain, as well as improve strength and ROM.  -GJ     STG 1 Progress Met  -GJ     STG 2 Pt will report 0 sleep disturbances related to shoulder  pain to improve overall quality of life.  -GJ     STG 2 Progress Met  -GJ     STG 3 Pt will improve R shoulder flexion to at least 160 deg without pain to improve ability to reach overhead.  -GJ     STG 3 Progress Met  -GJ            Long Term Goals    LTG Date to Achieve 08/15/22  -GJ     LTG 1 Pt will be independent and verbalized good understanding of advanced HEP to improve ability to manage pain, as well as improve strength and ROM.  -GJ     LTG 1 Progress Ongoing  -GJ     LTG 2 Pt will improve DASH score to at least </=10% perceived disability to show overall improved quality of life.  -GJ     LTG 2 Progress Ongoing  -GJ     LTG 3 Pt will demo at least 4+/5 R shoulder strength to improve ability to participate in overhead ADLs.  -GJ     LTG 3 Progress Partially Met  -GJ     LTG 4 Pt will report return to regular gym routine without  </=2/10 R shoulder pain, as well as verbalize appropriate understanding of general strengthening principles including gradual progression of weight training, appropriate rest days, and appropriate form with upper body strengthening.  -GJ     LTG 4 Progress Ongoing  -GJ     LTG 4 Progress Comments pt attended gym one time this week with no exacerbation of her baseline pain level  -GJ           User Key  (r) = Recorded By, (t) = Taken By, (c) = Cosigned By    Initials Name Provider Type    Georges Maloney, PT Physical Therapist                Therapy Education  Education Details: strengthening exercises to be once every other day, ROM ok daily. discussed activity modifications  Given: HEP, Symptoms/condition management, Pain management, Posture/body mechanics, Mobility training  Program: Reinforced  How Provided: Verbal, Demonstration  Provided to: Patient  Level of Understanding: Teach back education performed, Verbalized, Demonstrated              Time Calculation:   Start Time: 1006  Stop Time: 1033  Time Calculation (min): 27 min  Timed Charges  62945 - PT Therapeutic Exercise  Minutes: 27  Total Minutes  Timed Charges Total Minutes: 27   Total Minutes: 27  Therapy Charges for Today     Code Description Service Date Service Provider Modifiers Qty    68872925390  PT THER PROC EA 15 MIN 7/15/2022 Georges Vallejo, PT GP 2                    Georges Vallejo, PT  7/15/2022

## 2022-07-20 ENCOUNTER — HOSPITAL ENCOUNTER (OUTPATIENT)
Dept: PHYSICAL THERAPY | Facility: HOSPITAL | Age: 67
Setting detail: THERAPIES SERIES
Discharge: HOME | End: 2022-07-20

## 2022-07-20 DIAGNOSIS — M25.511 ACUTE PAIN OF RIGHT SHOULDER: Primary | ICD-10-CM

## 2022-07-20 DIAGNOSIS — R29.898 SHOULDER WEAKNESS: ICD-10-CM

## 2022-07-20 PROCEDURE — 97110 THERAPEUTIC EXERCISES: CPT

## 2022-07-20 NOTE — THERAPY TREATMENT NOTE
Outpatient Physical Therapy Ortho Treatment Note  [facility]     Patient Name: Stephanie Ferro  : 1955  MRN: [id number]  Today's Date: 2022      Visit Date: 2022    Visit Dx:    ICD-10-CM ICD-9-CM   1. Acute pain of right shoulder  M25.511 719.41   2. Shoulder weakness  R29.898 719.61       Patient Active Problem List   Diagnosis   • Osteoporosis   • Hyperlipidemia   • Anxiety and depression   • FH: premature coronary heart disease   • Colon cancer screening   • Insomnia        Past Medical History:   Diagnosis Date   • Anxiety and depression    • Hyperlipidemia    • Osteoporosis         Past Surgical History:   Procedure Laterality Date   • BREAST CYST ASPIRATION Left     27 yrs   • COLONOSCOPY     • COLONOSCOPY N/A 2018    Procedure: COLONOSCOPY into cecum and ti with cold biopsy polypectomies;  Surgeon: Asia Walters MD;  Location: [facility] ENDOSCOPY;  Service: Gastroenterology   • TONSILLECTOMY                          PT Assessment/Plan     Row Name 22 1000          PT Assessment    Assessment Comments Pt cont to report unsure if any changes overall, still notices pain at times. Does reporting noting improvements in pain following sessions, so discussed possibility of performing exercises in AM. Progressed scapular strengthening and endurance this date with updated HEP. Discussed decr strengthening freq to 2-3x per week.  -CC            PT Plan    PT Plan Comments Response to last session? Told pt not to perform HEP btwn sessions d/t only one day in between. Add SA foam roll up wall, consider Y wall slide with lift?  -CC           User Key  (r) = Recorded By, (t) = Taken By, (c) = Cosigned By    Initials Name Provider Type    Jacki Huber, PT Physical Therapist                   OP Exercises     Row Name 22 1000             Subjective Comments    Subjective Comments Not really doing good. I had a brief sharp pain in my R shoulder while driving this morning. I  "had a brief sharp pain randomly in my mid back yesterday.  -CC              Subjective Pain    Able to rate subjective pain? yes  -CC      Pre-Treatment Pain Level 0  -CC              Total Minutes    84872 - PT Therapeutic Exercise Minutes 40  -CC              Exercise 1    Exercise Name 1 ABC's at mirror  -CC      Cueing 1 Verbal  -CC      Reps 1 A-Z  -CC      Time 1 lvl 1 plyoball  -CC      Additional Comments 1 rest break during  -CC              Exercise 2    Exercise Name 2 Lateral wall taps (UE WB at wall with shldr horiz abduc)  -CC      Cueing 2 Verbal;Demo  -CC      Sets 2 2  -CC      Reps 2 20 total, alternting sides  -CC      Time 2 YTB looped  -CC      Additional Comments cues for SA \"push wall away\" but pt fatigues quickly  -CC              Exercise 3    Exercise Name 3 standing B ER  -CC      Cueing 3 Verbal;Tactile;Demo  -CC      Sets 3 3  -CC      Reps 3 10  -CC      Time 3 RTB  -CC              Exercise 4    Exercise Name 4 shldr flex with SA/looped band  -CC      Cueing 4 Verbal;Tactile;Demo  -CC      Sets 4 2  -CC      Reps 4 10  -CC      Time 4 YTB looped, slight bend in elbow, pain free range  -CC              Exercise 5    Exercise Name 5 low row w/ scap retract  -CC      Cueing 5 Verbal  -CC      Sets 5 3  -CC      Reps 5 10  -CC      Time 5 RTB  -CC      Additional Comments Cuing on scapular control  -CC              Exercise 6    Exercise Name 6 UBE  -CC      Time 6 3 min  -CC              Exercise 7    Exercise Name 7 standing D2 flex  -CC      Cueing 7 Verbal;Demo  -CC      Sets 7 2R  -CC      Reps 7 10  -CC      Time 7 YTB  -CC      Additional Comments cues for scap/shldr squeeze at top  -CC              Exercise 8    Exercise Name 8 Farmer's Carry  -CC      Cueing 8 Demo  -CC      Sets 8 4  -CC      Reps 8 15 ft ea way  -CC      Time 8 5# DB ea hand  -CC      Additional Comments mini squat to  DBs from mat ea set; cues for scap retract  -CC              Exercise 10    Exercise " Name 10 SB I, T, Y  -CC      Cueing 10 Verbal;Tactile;Demo  -CC      Reps 10 10 I and T, 5 Y  -CC      Time 10 5 sec  -CC      Additional Comments Improving form today  -CC            User Key  (r) = Recorded By, (t) = Taken By, (c) = Cosigned By    Initials Name Provider Type    CC Jacki Echeverria, PT Physical Therapist                                                Time Calculation:   Start Time: 0958  Stop Time: 1038  Time Calculation (min): 40 min  Total Timed Code Minutes- PT: 40 minute(s)  Timed Charges  31816 - PT Therapeutic Exercise Minutes: 40  Total Minutes  Timed Charges Total Minutes: 40   Total Minutes: 40  Therapy Charges for Today     Code Description Service Date Service Provider Modifiers Qty    54060489985 HC PT THER PROC EA 15 MIN 7/20/2022 Jacki Echeverria, PT GP 3                    Jacki Echeverria, SINDI  7/20/2022

## 2022-07-22 ENCOUNTER — HOSPITAL ENCOUNTER (OUTPATIENT)
Dept: PHYSICAL THERAPY | Facility: HOSPITAL | Age: 67
Setting detail: THERAPIES SERIES
Discharge: HOME | End: 2022-07-22

## 2022-07-22 DIAGNOSIS — R29.898 SHOULDER WEAKNESS: ICD-10-CM

## 2022-07-22 DIAGNOSIS — M25.511 ACUTE PAIN OF RIGHT SHOULDER: Primary | ICD-10-CM

## 2022-07-22 PROCEDURE — 97110 THERAPEUTIC EXERCISES: CPT | Performed by: PHYSICAL THERAPIST

## 2022-07-22 NOTE — THERAPY TREATMENT NOTE
Outpatient Physical Therapy Ortho Treatment Note  [facility]     Patient Name: Stephanie Ferro  : 1955  MRN: [id number]  Today's Date: 2022      Visit Date: 2022    Visit Dx:    ICD-10-CM ICD-9-CM   1. Acute pain of right shoulder  M25.511 719.41   2. Shoulder weakness  R29.898 719.61       Patient Active Problem List   Diagnosis   • Osteoporosis   • Hyperlipidemia   • Anxiety and depression   • FH: premature coronary heart disease   • Colon cancer screening   • Insomnia        Past Medical History:   Diagnosis Date   • Anxiety and depression    • Hyperlipidemia    • Osteoporosis         Past Surgical History:   Procedure Laterality Date   • BREAST CYST ASPIRATION Left     27 yrs   • COLONOSCOPY     • COLONOSCOPY N/A 2018    Procedure: COLONOSCOPY into cecum and ti with cold biopsy polypectomies;  Surgeon: Asia Walters MD;  Location: [facility] ENDOSCOPY;  Service: Gastroenterology   • TONSILLECTOMY                          PT Assessment/Plan     Row Name 22 1046          PT Assessment    Assessment Comments Ms. Ferro returns to the clinic, reporting possibly turning a corner over the last few days. We continued to work on scpular girde/RC strengthening, introducing 2 new exercises (did not issue for home yet, will assess response). She is encouraged to continue strengthening to be once every other day, stretches/ROM daily. Ms. Ferro continues to be a good candidate for skilled physical therapy.  -ROSEMARIE            PT Plan    PT Plan Comments assess response new exercises, continue to strengthen RC/scapular girdle strengthening as able.  -ROSEMARIE           User Key  (r) = Recorded By, (t) = Taken By, (c) = Cosigned By    Initials Name Provider Type    Georges Maloney, PT Physical Therapist                   OP Exercises     Row Name 22 1004 22 1000          Subjective Comments    Subjective Comments -- I am feeling a little better. I felt good after Wed's appt and I  "noticed less pain yesterday.  -GJ            Total Minutes    56007 - PT Therapeutic Exercise Minutes 41  -GJ --            Exercise 1    Exercise Name 1 -- ABC's at mirror  -GJ     Cueing 1 -- Verbal  -GJ     Reps 1 -- A-Z  -GJ     Time 1 -- lvl 1 plyoball  -GJ     Additional Comments -- 1 break at P  -GJ            Exercise 2    Exercise Name 2 -- Lateral wall taps (UE WB at wall with shldr horiz abduc)  -GJ     Cueing 2 -- Verbal;Demo  -GJ     Sets 2 -- 2  -GJ     Reps 2 -- 20 total, alternting sides  -GJ     Time 2 -- YTB looped  -GJ     Additional Comments -- cues for SA \"push wall away\" but pt fatigues quickly  -GJ            Exercise 3    Exercise Name 3 -- standing B ER  -GJ     Cueing 3 -- Verbal;Tactile;Demo  -GJ     Sets 3 -- 3  -GJ     Reps 3 -- 10  -GJ     Time 3 -- RTB  -GJ            Exercise 4    Exercise Name 4 -- shldr flex with SA/looped band  -GJ     Cueing 4 -- Verbal;Tactile;Demo  -GJ     Sets 4 -- 2  -GJ     Reps 4 -- 10  -GJ     Time 4 -- YTB looped, slight bend in elbow, pain free range  -GJ            Exercise 5    Exercise Name 5 -- low row w/ scap retract  -GJ     Cueing 5 -- Verbal  -GJ     Sets 5 -- 2  -GJ     Reps 5 -- 15  -GJ     Time 5 -- GTB  -GJ     Additional Comments -- Cuing on scapular control  -GJ            Exercise 6    Exercise Name 6 -- UBE  -GJ     Time 6 -- 3 min  -GJ            Exercise 7    Exercise Name 7 -- standing D2 flex  -GJ     Cueing 7 -- Verbal;Demo  -GJ     Sets 7 -- 3R  -GJ     Reps 7 -- 10  -GJ     Time 7 -- YTB  -GJ     Additional Comments -- cues for scap/shldr squeeze at top  -GJ            Exercise 8    Exercise Name 8 -- Farmer's Carry  -GJ     Cueing 8 -- Demo  -GJ     Sets 8 -- 5  -GJ     Reps 8 -- 15 ft ea way  -GJ     Time 8 -- 5# DB ea hand  -GJ     Additional Comments -- mini squat to  DBs from mat ea set; cues for scap retract  -GJ            Exercise 10    Exercise Name 10 -- SB I, T, Y  -GJ     Cueing 10 -- Verbal;Tactile;Demo  -GJ     " Reps 10 -- 10 I and T, 5 Y  -GJ     Time 10 -- 5 sec  -GJ            Exercise 11    Exercise Name 11 -- wall slides Y with lift off at end  -GJ     Cueing 11 -- Verbal;Demo  -GJ     Reps 11 -- 10  -GJ            Exercise 12    Exercise Name 12 -- roll foam roll up, serratus punch throughout  -GJ     Cueing 12 -- Verbal;Demo  -GJ     Reps 12 -- 2 x 10  -GJ           User Key  (r) = Recorded By, (t) = Taken By, (c) = Cosigned By    Initials Name Provider Type    Georges Maloney, PT Physical Therapist                              PT OP Goals     Row Name 07/22/22 1000          PT Short Term Goals    STG Date to Achieve 07/16/22  -     STG 1 Pt will be independent and verbalized good understanding of initial HEP to improve ability to manage pain, as well as improve strength and ROM.  -GJ     STG 1 Progress Met  -GJ     STG 2 Pt will report 0 sleep disturbances related to shoulder pain to improve overall quality of life.  -GJ     STG 2 Progress Met  -GJ     STG 3 Pt will improve R shoulder flexion to at least 160 deg without pain to improve ability to reach overhead.  -     STG 3 Progress Met  -            Long Term Goals    LTG Date to Achieve 08/15/22  -GJ     LTG 1 Pt will be independent and verbalized good understanding of advanced HEP to improve ability to manage pain, as well as improve strength and ROM.  -GJ     LTG 1 Progress Ongoing  -GJ     LTG 2 Pt will improve DASH score to at least </=10% perceived disability to show overall improved quality of life.  -GJ     LTG 2 Progress Ongoing  -GJ     LTG 3 Pt will demo at least 4+/5 R shoulder strength to improve ability to participate in overhead ADLs.  -GJ     LTG 3 Progress Partially Met  -GJ     LTG 4 Pt will report return to regular gym routine without  </=2/10 R shoulder pain, as well as verbalize appropriate understanding of general strengthening principles including gradual progression of weight training, appropriate rest days, and appropriate form  with upper body strengthening.  -GJ     LTG 4 Progress Ongoing  -GJ           User Key  (r) = Recorded By, (t) = Taken By, (c) = Cosigned By    Initials Name Provider Type    Georges Maloney, PT Physical Therapist                Therapy Education  Education Details: did not issue new exercises for home at this time, continue with strengthenig once every other week, stretching/ROM daily  Given: HEP, Symptoms/condition management, Pain management, Posture/body mechanics, Mobility training  Program: Reinforced, Progressed, New  How Provided: Verbal, Demonstration  Provided to: Patient  Level of Understanding: Teach back education performed, Verbalized, Demonstrated              Time Calculation:   Start Time: 1004  Stop Time: 1045  Time Calculation (min): 41 min  Timed Charges  85843 - PT Therapeutic Exercise Minutes: 41  Total Minutes  Timed Charges Total Minutes: 41   Total Minutes: 41  Therapy Charges for Today     Code Description Service Date Service Provider Modifiers Qty    60197456076 HC PT THER PROC EA 15 MIN 7/22/2022 Georges Vallejo, PT GP 3                    Georges Vallejo, PT  7/22/2022

## 2022-07-27 ENCOUNTER — HOSPITAL ENCOUNTER (OUTPATIENT)
Dept: PHYSICAL THERAPY | Facility: HOSPITAL | Age: 67
Setting detail: THERAPIES SERIES
Discharge: HOME | End: 2022-07-27

## 2022-07-27 DIAGNOSIS — M25.511 ACUTE PAIN OF RIGHT SHOULDER: Primary | ICD-10-CM

## 2022-07-27 DIAGNOSIS — R29.898 SHOULDER WEAKNESS: ICD-10-CM

## 2022-07-27 PROCEDURE — 97110 THERAPEUTIC EXERCISES: CPT

## 2022-07-27 NOTE — THERAPY TREATMENT NOTE
Outpatient Physical Therapy Ortho Treatment Note  [facility]     Patient Name: Stephanie Ferro  : 1955  MRN: [id number]  Today's Date: 2022      Visit Date: 2022    Visit Dx:    ICD-10-CM ICD-9-CM   1. Acute pain of right shoulder  M25.511 719.41   2. Shoulder weakness  R29.898 719.61       Patient Active Problem List   Diagnosis   • Osteoporosis   • Hyperlipidemia   • Anxiety and depression   • FH: premature coronary heart disease   • Colon cancer screening   • Insomnia        Past Medical History:   Diagnosis Date   • Anxiety and depression    • Hyperlipidemia    • Osteoporosis         Past Surgical History:   Procedure Laterality Date   • BREAST CYST ASPIRATION Left     27 yrs   • COLONOSCOPY     • COLONOSCOPY N/A 2018    Procedure: COLONOSCOPY into cecum and ti with cold biopsy polypectomies;  Surgeon: Asia Walters MD;  Location: [facility] ENDOSCOPY;  Service: Gastroenterology   • TONSILLECTOMY                          PT Assessment/Plan     Row Name 22 1300          PT Assessment    Assessment Comments Continued with progression of scapular/shoulder girdle strength challenges. Pt continued to require cues for appropriate pacing and rest breaks with ther-ex, as she fatigues quickly but tends to omit breaks between sets. Educate on purpose of grouping repititions into sets, to allow for muscle recovery and adherence to optimal form.  -            PT Plan    PT Plan Comments Cont to encourage appropriate rest breaks between sets. Review form with [facility] at . May consider adding shoulder drivers?  -           User Key  (r) = Recorded By, (t) = Taken By, (c) = Cosigned By    Initials Name Provider Type    Jacki Huber, PT Physical Therapist                   OP Exercises     Row Name 22 1000             Subjective Comments    Subjective Comments Reports some soreness when reaching overhead yesterday  -              Total Minutes    75692 - PT Therapeutic  Exercise Minutes 38  -CC              Exercise 1    Exercise Name 1 ABC's at mirror  -CC      Cueing 1 Verbal  -CC      Reps 1 A-Z  -CC      Time 1 lvl 1 plyoball  -CC      Additional Comments no break today  -CC              Exercise 3    Exercise Name 3 unilateral ER  -CC      Cueing 3 Verbal;Tactile;Demo  -CC      Sets 3 2 R  -CC      Reps 3 10  -CC      Time 3 10#, handle at CC, towel  -CC      Additional Comments much cueing for form: shoulders back, elbow even with body, isolating rotation rather than abduction  -CC              Exercise 5    Exercise Name 5 low row w/ UH   -CC      Cueing 5 Verbal;Demo  -CC      Sets 5 3  -CC      Reps 5 10  -CC      Time 5 30#, bar at CC  -CC      Additional Comments cues for scap retract, knees unlocked  -CC              Exercise 6    Exercise Name 6 UBE  -CC      Time 6 4 min  -CC              Exercise 8    Exercise Name 8 Farmer's Carry  -CC      Cueing 8 Demo  -CC      Sets 8 5  -CC      Reps 8 15 ft ea way  -CC      Time 8 8# DB ea hand  -CC      Additional Comments mini squat to  DBs from mat ea set; cues for scap retract  -CC              Exercise 9    Exercise Name 9 wall push up plus  -CC      Cueing 9 Demo  -CC      Sets 9 2  -CC      Reps 9 10  -CC              Exercise 10    Exercise Name 10 SB I, T, Y  -CC      Cueing 10 Verbal;Tactile  -CC      Reps 10 10 I and T, 5 Y  -CC      Time 10 5 sec  -CC      Additional Comments improving form with decr cues today  -CC              Exercise 11    Exercise Name 11 wall slides Y with lift off at end  -CC      Cueing 11 Verbal;Demo  -CC      Reps 11 10  -CC      Additional Comments cues for form  -CC              Exercise 12    Exercise Name 12 roll foam roll up, serratus punch throughout  -CC      Cueing 12 Verbal;Demo  -CC      Reps 12 2 x 10  -CC            User Key  (r) = Recorded By, (t) = Taken By, (c) = Cosigned By    Initials Name Provider Type    CC Jacki Echeverria, PT Physical Therapist                                                 Time Calculation:   Start Time: 1004  Stop Time: 1042  Time Calculation (min): 38 min  Total Timed Code Minutes- PT: 38 minute(s)  Timed Charges  83640 - PT Therapeutic Exercise Minutes: 38  Total Minutes  Timed Charges Total Minutes: 38   Total Minutes: 38  Therapy Charges for Today     Code Description Service Date Service Provider Modifiers Qty    94160118464 HC PT THER PROC EA 15 MIN 7/27/2022 Jacki Echeverria, PT GP 3                    Jacki Echeverria, PT  7/27/2022

## 2022-07-28 ENCOUNTER — TRANSCRIBE ORDERS (OUTPATIENT)
Dept: PHYSICAL THERAPY | Facility: HOSPITAL | Age: 67
End: 2022-07-28

## 2022-07-28 ENCOUNTER — OFFICE VISIT (OUTPATIENT)
Dept: INTERNAL MEDICINE | Facility: CLINIC | Age: 67
End: 2022-07-28

## 2022-07-28 VITALS
TEMPERATURE: 96.4 F | BODY MASS INDEX: 20.72 KG/M2 | OXYGEN SATURATION: 99 % | HEIGHT: 62 IN | DIASTOLIC BLOOD PRESSURE: 70 MMHG | SYSTOLIC BLOOD PRESSURE: 100 MMHG | HEART RATE: 68 BPM | WEIGHT: 112.6 LBS

## 2022-07-28 DIAGNOSIS — H00.015 HORDEOLUM EXTERNUM LEFT LOWER EYELID: ICD-10-CM

## 2022-07-28 DIAGNOSIS — G47.00 INSOMNIA, UNSPECIFIED TYPE: Primary | Chronic | ICD-10-CM

## 2022-07-28 DIAGNOSIS — M25.511 ACUTE PAIN OF RIGHT SHOULDER: Primary | ICD-10-CM

## 2022-07-28 PROCEDURE — 99213 OFFICE O/P EST LOW 20 MIN: CPT | Performed by: NURSE PRACTITIONER

## 2022-07-28 RX ORDER — SULFACETAMIDE SODIUM 100 MG/ML
1 SOLUTION/ DROPS OPHTHALMIC
Qty: 5 ML | Refills: 0 | Status: SHIPPED | OUTPATIENT
Start: 2022-07-28 | End: 2022-09-26

## 2022-07-28 RX ORDER — TRAZODONE HYDROCHLORIDE 50 MG/1
TABLET ORAL
Qty: 90 TABLET | Refills: 1 | Status: SHIPPED | OUTPATIENT
Start: 2022-07-28

## 2022-07-28 NOTE — PROGRESS NOTES
"        Chief Complaint  Stye (Patient presents here today with a stye almost completely gone but is still sore on her left eye.  ) and Insomnia (Medication refill )     Subjective:      History of Present Illness {CC  Problem List  Visit  Diagnosis   Encounters  Notes  Medications  Labs  Result Review Imaging  Media :23}     Stephanie Ferro presents to [facility] for:      1) left eye: stye  She had similar 6/3/2022 - she has used some of the Bleph 10 and already improving.   Advised to continue warm compresses.  Discussed compress she can get at pharmacy to microwave and use on eye.   If doesn't improve or worsens - may need follow up with ophthalmology.     2) insomnia: chronic and has been improved on trazodone. She has taken very little - bottle is actually so old, can't read anything on it.     Did not get to go on her trip to [place] - cancelled due to COVID.     I have reviewed patient's medical history, any new submitted information provided by patient or medical assistant and updated medical record.      Objective:      Physical Exam  Eyes:      General:         Left eye: Hordeolum present.         Result Review  Data Reviewed:{ Labs  Result Review  Imaging  Med Tab  Media :23}                Vital Signs:   /70 (BP Location: Left arm, Patient Position: Sitting, Cuff Size: Adult)   Pulse 68   Temp 96.4 °F (35.8 °C) (Temporal)   Ht 157.5 cm (62\")   Wt 51.1 kg (112 lb 9.6 oz)   SpO2 99%   BMI 20.59 kg/m²         Requested Prescriptions     Signed Prescriptions Disp Refills   • traZODone (DESYREL) 50 MG tablet 90 tablet 1     Sig: Take 1/2 to 1 tablet nightly as needed for insomnia.   • sulfacetamide (Bleph-10) 10 % ophthalmic solution 5 mL 0     Sig: Administer 1 drop into the left eye Every 3 (Three) Hours.       Routine medications provided by this office will also be refilled via pharmacy request.       Current Outpatient Medications:   •  meloxicam " (Mobic) 15 MG tablet, Take 1 tablet by mouth Daily. Take with FOOD, Disp: 30 tablet, Rfl: 0  •  sertraline (ZOLOFT) 100 MG tablet, Take 1 tablet by mouth Daily., Disp: 90 tablet, Rfl: 1  •  sulfacetamide (Bleph-10) 10 % ophthalmic solution, Administer 1 drop into the left eye Every 3 (Three) Hours., Disp: 5 mL, Rfl: 0  •  traZODone (DESYREL) 50 MG tablet, Take 1/2 to 1 tablet nightly as needed for insomnia., Disp: 90 tablet, Rfl: 1     Assessment and Plan:      Assessment and Plan {CC Problem List  Visit Diagnosis  ROS  Review (Popup)  Health Maintenance  Quality  BestPractice  Medications  SmartSets  SnapShot Encounters  Media :23}     Problem List Items Addressed This Visit        Sleep    Insomnia - Primary (Chronic)    Current Assessment & Plan     Trazodone has been effective when needed.   She uses sparingly.   Refilled today.            Other Visit Diagnoses     Hordeolum externum left lower eyelid        Warm compresses every 1-2 hours until improved.     Relevant Medications    sulfacetamide (Bleph-10) 10 % ophthalmic solution          Follow Up {Instructions Charge Capture  Follow-up Communications :23}     Return for Next scheduled follow up.      Patient was given instructions and counseling regarding her condition or for health maintenance advice. Please see specific information pulled into the AVS if appropriate.    Dragon disclaimer:   Much of this encounter note is an electronic transcription/translation of spoken language to printed text. The electronic translation of spoken language may permit erroneous, or at times, nonsensical words or phrases to be inadvertently transcribed; Although I have reviewed the note for such errors, some may still exist.     Additional Patient Counseling:       There are no Patient Instructions on file for this visit.

## 2022-07-29 ENCOUNTER — HOSPITAL ENCOUNTER (OUTPATIENT)
Dept: PHYSICAL THERAPY | Facility: HOSPITAL | Age: 67
Setting detail: THERAPIES SERIES
Discharge: HOME | End: 2022-07-29

## 2022-07-29 DIAGNOSIS — M25.511 ACUTE PAIN OF RIGHT SHOULDER: Primary | ICD-10-CM

## 2022-07-29 DIAGNOSIS — R29.898 SHOULDER WEAKNESS: ICD-10-CM

## 2022-07-29 PROCEDURE — 97110 THERAPEUTIC EXERCISES: CPT | Performed by: PHYSICAL THERAPIST

## 2022-07-29 NOTE — THERAPY DISCHARGE NOTE
Outpatient Physical Therapy Ortho Treatment Note/Discharge Summary  [facility]     Patient Name: Stephanie Ferro  : 1955  MRN: [id number]  Today's Date: 2022      Visit Date: 2022    Visit Dx:    ICD-10-CM ICD-9-CM   1. Acute pain of right shoulder  M25.511 719.41   2. Shoulder weakness  R29.898 719.61       Patient Active Problem List   Diagnosis   • Osteoporosis   • Hyperlipidemia   • Anxiety and depression   • FH: premature coronary heart disease   • Colon cancer screening   • Insomnia        Past Medical History:   Diagnosis Date   • Anxiety and depression    • Hyperlipidemia    • Osteoporosis         Past Surgical History:   Procedure Laterality Date   • BREAST CYST ASPIRATION Left     27 yrs   • COLONOSCOPY     • COLONOSCOPY N/A 2018    Procedure: COLONOSCOPY into cecum and ti with cold biopsy polypectomies;  Surgeon: Asia Walters MD;  Location: [facility] ENDOSCOPY;  Service: Gastroenterology   • TONSILLECTOMY                          PT Assessment/Plan     Row Name 22 1044          PT Assessment    Assessment Comments Ms. Ferro attended 10 sessions of physical therapy from 2022-2022 for her R shoulder pain. She is independent with her HEP, verbalizes a good understanding of her condition and reports readiness to attempt trial of independent management.  Issued updated HEP, strengthening to be performed once every other day, stretches to be daily.  Encouraged her to call with questions/concerns.  Answered questions. Ms. ferro is discharged from outpatient physical therapy to an independent program.  -GJ            PT Plan    PT Plan Comments transition to independent program.  -ROSEMARIE           User Key  (r) = Recorded By, (t) = Taken By, (c) = Cosigned By    Initials Name Provider Type    Georges Maloney, PT Physical Therapist                     OP Exercises     Row Name 22 1001 22 1000          Subjective Comments    Subjective Comments -- I'm  doing ok, I think I'm getting better. My shoulder is   -GJ            Total Minutes    37095 - PT Therapeutic Exercise Minutes 40  -GJ --            Exercise 1    Exercise Name 1 -- ABC's at mirror  -GJ     Cueing 1 -- Verbal  -GJ     Reps 1 -- A-Z  -GJ     Time 1 -- lvl 1 plyoball  -GJ     Additional Comments -- no break today  -GJ            Exercise 3    Exercise Name 3 -- unilateral ER  -GJ     Cueing 3 -- Verbal;Tactile;Demo  -GJ     Sets 3 -- 2 R  -GJ     Reps 3 -- 10  -GJ     Time 3 -- 10#, handle at CC, towel  -GJ            Exercise 5    Exercise Name 5 -- low row w/ UH   -GJ     Cueing 5 -- Verbal;Demo  -GJ     Sets 5 -- 3  -GJ     Reps 5 -- 10  -GJ     Time 5 -- 30#, bar at CC  -GJ     Additional Comments -- cues for scap retract, knees unlocked  -GJ            Exercise 6    Exercise Name 6 -- UBE  -GJ     Time 6 -- 4 min  -GJ     Additional Comments -- F/B  -GJ            Exercise 8    Exercise Name 8 -- Farmer's Carry  -GJ     Cueing 8 -- Demo  -GJ     Sets 8 -- 5  -GJ     Reps 8 -- 20 ft ea way  -GJ     Time 8 -- 8# DB ea hand  -GJ     Additional Comments -- mini squat to  DBs from mat ea set; cues for scap retract  -GJ            Exercise 9    Exercise Name 9 -- wall push up plus  -GJ     Cueing 9 -- Demo;Verbal  -GJ     Sets 9 -- 2  -GJ     Reps 9 -- 10  -GJ            Exercise 10    Exercise Name 10 -- SB I, T, Y  -GJ     Cueing 10 -- Verbal;Tactile  -GJ     Reps 10 -- 10 I and T, 5 Y  -GJ     Time 10 -- 5 sec  -GJ            Exercise 11    Exercise Name 11 -- wall slides Y with lift off at end  -GJ     Cueing 11 -- Verbal;Demo  -GJ     Sets 11 -- 2  -GJ     Reps 11 -- 10  -GJ           User Key  (r) = Recorded By, (t) = Taken By, (c) = Cosigned By    Initials Name Provider Type    GJ Georges Vallejo W, PT Physical Therapist                                PT OP Goals     Row Name 07/29/22 1000          PT Short Term Goals    STG Date to Achieve 07/16/22  -GJ     STG 1 Pt will be  independent and verbalized good understanding of initial HEP to improve ability to manage pain, as well as improve strength and ROM.  -GJ     STG 1 Progress Met  -GJ     STG 2 Pt will report 0 sleep disturbances related to shoulder pain to improve overall quality of life.  -GJ     STG 2 Progress Met  -GJ     STG 3 Pt will improve R shoulder flexion to at least 160 deg without pain to improve ability to reach overhead.  -GJ     STG 3 Progress Met  -GJ            Long Term Goals    LTG Date to Achieve 08/15/22  -GJ     LTG 1 Pt will be independent and verbalized good understanding of advanced HEP to improve ability to manage pain, as well as improve strength and ROM.  -GJ     LTG 1 Progress Met  -GJ     LTG 2 Pt will improve DASH score to at least </=10% perceived disability to show overall improved quality of life.  -GJ     LTG 2 Progress Not Met  -GJ     LTG 2 Progress Comments not reassessed  -GJ     LTG 3 Pt will demo at least 4+/5 R shoulder strength to improve ability to participate in overhead ADLs.  -GJ     LTG 3 Progress Partially Met  -GJ     LTG 4 Pt will report return to regular gym routine without  </=2/10 R shoulder pain, as well as verbalize appropriate understanding of general strengthening principles including gradual progression of weight training, appropriate rest days, and appropriate form with upper body strengthening.  -GJ     LTG 4 Progress Partially Met  -GJ           User Key  (r) = Recorded By, (t) = Taken By, (c) = Cosigned By    Initials Name Provider Type    Georges Maloney, PT Physical Therapist                Therapy Education  Education Details: reviewed importance of rest between sets, strengthening to be once every other. reviewed activity modifications, ergonomics//lifting techniques/ergonoimcis, avoid lifting objects from back seat/passenger seat. Reprinted updatd HEP. Encouraged pt to call with questions.  Given: HEP, Symptoms/condition management, Pain management, Posture/body  mechanics, Mobility training  Program: Reinforced, New, Progressed  How Provided: Verbal, Demonstration, Written  Provided to: Patient  Level of Understanding: Teach back education performed, Verbalized, Demonstrated              Time Calculation:   Start Time: 1001  Stop Time: 1043  Time Calculation (min): 42 min  Timed Charges  56604 - PT Therapeutic Exercise Minutes: 40  Total Minutes  Timed Charges Total Minutes: 40   Total Minutes: 40  Therapy Charges for Today     Code Description Service Date Service Provider Modifiers Qty    11457137001 HC PT THER PROC EA 15 MIN 7/29/2022 Georges Vallejo, PT GP 3                OP PT Discharge Summary  Date of Discharge: 07/29/22  Reason for Discharge: Independent, Patient/Caregiver request  Outcomes Achieved: Patient able to partially acheive established goals  Discharge Destination: Home with home program  Discharge Instructions/Additional Comments: continue HEP, strengthening once every other day, stretches daily. call with questions.      Georges Vallejo, PT  7/29/2022

## 2022-08-18 ENCOUNTER — OFFICE VISIT (OUTPATIENT)
Dept: INTERNAL MEDICINE | Facility: CLINIC | Age: 67
End: 2022-08-18

## 2022-08-18 VITALS
SYSTOLIC BLOOD PRESSURE: 112 MMHG | TEMPERATURE: 98.1 F | BODY MASS INDEX: 20.43 KG/M2 | HEART RATE: 68 BPM | WEIGHT: 111 LBS | DIASTOLIC BLOOD PRESSURE: 60 MMHG | HEIGHT: 62 IN | OXYGEN SATURATION: 99 %

## 2022-08-18 DIAGNOSIS — L98.9 SKIN LESION: ICD-10-CM

## 2022-08-18 DIAGNOSIS — K12.0 CANKER SORE: Primary | ICD-10-CM

## 2022-08-18 DIAGNOSIS — H00.015 HORDEOLUM EXTERNUM OF LEFT LOWER EYELID: ICD-10-CM

## 2022-08-18 PROCEDURE — 99214 OFFICE O/P EST MOD 30 MIN: CPT | Performed by: NURSE PRACTITIONER

## 2022-08-18 NOTE — ASSESSMENT & PLAN NOTE
Recommended to f/u with optho if symptoms do not improve. May need I&D to the area.   Patient should continue with warm compresses.   Patient would like to continue sulfacetamide at this time.

## 2022-08-18 NOTE — ASSESSMENT & PLAN NOTE
Advised to abstain from  use until lesion clears.   Continue with salt water mouth rinses to help clear the canker sore.   Patient may benefit from meeting with dentist to see if aligners can be better fitted to prevent this from recurring.

## 2022-08-18 NOTE — PROGRESS NOTES
"Chief Complaint  Mouth Lesions (Under top lip) and Stye (Left eye)    Subjective        Stephanie Ferro presents to [facility]  History of Present Illness  This is a 67 y/o female presenting to office for complaints of mouth sore. Patient reports wearing dental aligners at night. Patient reports the aligners had been rubbing the area which caused some pain. Patient reports she has been swishing with salt water to help with this.     Patient reports left hordeolum. Patient has been using the sulfacetamide solution to help with this. Patient had reports some improvement but that it did not clear. Patient reports using warm compresses to this area as well. Patient would like to continue using the sulfacetamide solution before seeing opthalmologist.       Objective   Vital Signs:  /60   Pulse 68   Temp 98.1 °F (36.7 °C)   Ht 157.5 cm (62\")   Wt 50.3 kg (111 lb)   SpO2 99%   BMI 20.30 kg/m²   Estimated body mass index is 20.3 kg/m² as calculated from the following:    Height as of this encounter: 157.5 cm (62\").    Weight as of this encounter: 50.3 kg (111 lb).    BMI is within normal parameters. No other follow-up for BMI required.      Physical Exam  Vitals and nursing note reviewed.   Constitutional:       Appearance: Normal appearance.   HENT:      Head: Normocephalic and atraumatic.      Mouth/Throat:      Comments: +canker sore; pencil eraser sized to right upper inner lip region; no drainage noted  Eyes:      General:         Left eye: Hordeolum present.No discharge.      Pupils: Pupils are equal, round, and reactive to light.   Cardiovascular:      Rate and Rhythm: Normal rate and regular rhythm.      Pulses: Normal pulses.      Heart sounds: Normal heart sounds. No murmur heard.    No friction rub. No gallop.   Pulmonary:      Effort: Pulmonary effort is normal. No respiratory distress.      Breath sounds: Normal breath sounds. No stridor. No wheezing, rhonchi or rales. "   Musculoskeletal:         General: Normal range of motion.      Cervical back: Normal range of motion and neck supple.   Skin:     General: Skin is warm and dry.          Neurological:      General: No focal deficit present.      Mental Status: She is alert and oriented to person, place, and time. Mental status is at baseline.      Motor: No weakness.   Psychiatric:         Mood and Affect: Mood normal.         Thought Content: Thought content normal.         Judgment: Judgment normal.        Result Review :  The following data was reviewed by: DARRYL Flores on 08/18/2022:  Common labs    Common Labsle 11/3/21 11/3/21 11/3/21 11/3/21    0000 0000 0000 0000   Glucose 130 (A)      BUN 13      Creatinine 0.78      eGFR Non  Am 79      eGFR African Am 92      Sodium 139      Potassium 3.8      Chloride 103      Calcium 9.1      Total Protein 6.8      Albumin 4.5      Total Bilirubin 0.2      Alkaline Phosphatase 68      AST (SGOT) 19      ALT (SGPT) 12      WBC   5.7    Hemoglobin   13.9    Hematocrit   42.1    Platelets   168    Total Cholesterol  210 (A)     Triglycerides  176 (A)     HDL Cholesterol  51     LDL Cholesterol   128 (A)     Hemoglobin A1C    5.6   (A) Abnormal value       Comments are available for some flowsheets but are not being displayed.                    Assessment and Plan   Diagnoses and all orders for this visit:    1. Canker sore (Primary)  Assessment & Plan:  Advised to abstain from  use until lesion clears.   Continue with salt water mouth rinses to help clear the canker sore.   Patient may benefit from meeting with dentist to see if aligners can be better fitted to prevent this from recurring.       2. Hordeolum externum of left lower eyelid  Assessment & Plan:  Recommended to f/u with optho if symptoms do not improve. May need I&D to the area.   Patient should continue with warm compresses.   Patient would like to continue sulfacetamide at this time.       3. Skin  lesion  Assessment & Plan:  Recommended to f/u with dermatology due to previous hx of biopsies/treatment.              Follow Up   Return for Next scheduled follow up 12/6/22 with jordyn cloud.  Patient was given instructions and counseling regarding her condition or for health maintenance advice. Please see specific information pulled into the AVS if appropriate.

## 2022-09-26 ENCOUNTER — APPOINTMENT (OUTPATIENT)
Dept: GENERAL RADIOLOGY | Facility: HOSPITAL | Age: 67
End: 2022-09-26

## 2022-09-26 PROCEDURE — 71101 X-RAY EXAM UNILAT RIBS/CHEST: CPT | Performed by: GENERAL PRACTICE

## 2022-12-07 ENCOUNTER — TELEPHONE (OUTPATIENT)
Dept: INTERNAL MEDICINE | Facility: CLINIC | Age: 67
End: 2022-12-07

## 2022-12-07 DIAGNOSIS — H00.016 HORDEOLUM EXTERNUM OF LEFT EYE, UNSPECIFIED EYELID: Primary | ICD-10-CM

## 2022-12-07 RX ORDER — SULFACETAMIDE SODIUM 100 MG/ML
1 SOLUTION/ DROPS OPHTHALMIC
Qty: 5 ML | Refills: 0 | OUTPATIENT
Start: 2022-12-07 | End: 2023-01-07

## 2022-12-07 NOTE — TELEPHONE ENCOUNTER
Caller: Stephanie Ferro    Relationship: Self    Best call back number: [phone]    What medication are you requesting: SULFACETAMIDE SODIUM  OPHTHALMIC SOLUTION USP 10%    What are your current symptoms: PATIENT HAS A STYE AGAIN IN HER LEFT EYE.    How long have you been experiencing symptoms: SINCE MONDAY    Have you had these symptoms before:    [x] Yes  [] No    Have you been treated for these symptoms before:   [x] Yes  [] No    If a prescription is needed, what is your preferred pharmacy and phone number:    [id number]  [phone]  Additional notes:  PLEASE ADVISE PATIENT IF [place] CAN'T [place] [facility] [facility] [place] IF [place] NEEDS AN APPOINTMENT.

## 2022-12-07 NOTE — TELEPHONE ENCOUNTER
Please advise     Patient was seen on 07/28/22 for sayda gave patient eye drops. Okay to refill drops ?       EWA Santillan

## 2022-12-26 DIAGNOSIS — F41.9 ANXIETY AND DEPRESSION: ICD-10-CM

## 2022-12-26 DIAGNOSIS — F32.A ANXIETY AND DEPRESSION: ICD-10-CM

## 2022-12-27 RX ORDER — SERTRALINE HYDROCHLORIDE 100 MG/1
TABLET, FILM COATED ORAL
Qty: 90 TABLET | Refills: 1 | Status: SHIPPED | OUTPATIENT
Start: 2022-12-27

## 2022-12-27 NOTE — TELEPHONE ENCOUNTER
Rx Refill Note  Requested Prescriptions     Pending Prescriptions Disp Refills   • sertraline (ZOLOFT) 100 MG tablet [Pharmacy Med Name: SERTRALINE  MG TABLET] 90 tablet 1     Sig: TAKE ONE TABLET BY MOUTH DAILY      Last office visit with prescribing clinician: 7/28/2022   Last telemedicine visit with prescribing clinician: 5/9/2023   Next office visit with prescribing clinician: 5/9/2023

## 2023-01-09 ENCOUNTER — OFFICE VISIT (OUTPATIENT)
Dept: INTERNAL MEDICINE | Facility: CLINIC | Age: 68
End: 2023-01-09
Payer: MEDICARE

## 2023-01-09 VITALS
SYSTOLIC BLOOD PRESSURE: 114 MMHG | WEIGHT: 112 LBS | HEIGHT: 62 IN | BODY MASS INDEX: 20.61 KG/M2 | DIASTOLIC BLOOD PRESSURE: 78 MMHG | HEART RATE: 90 BPM | OXYGEN SATURATION: 95 %

## 2023-01-09 DIAGNOSIS — J06.9 UPPER RESPIRATORY TRACT INFECTION, UNSPECIFIED TYPE: ICD-10-CM

## 2023-01-09 DIAGNOSIS — R05.1 ACUTE COUGH: Primary | ICD-10-CM

## 2023-01-09 PROCEDURE — 99213 OFFICE O/P EST LOW 20 MIN: CPT | Performed by: NURSE PRACTITIONER

## 2023-01-09 RX ORDER — BENZONATATE 100 MG/1
100 CAPSULE ORAL 3 TIMES DAILY PRN
Qty: 30 CAPSULE | Refills: 0 | Status: SHIPPED | OUTPATIENT
Start: 2023-01-09

## 2023-01-09 NOTE — PROGRESS NOTES
Chief Complaint  Follow-up (Urgent Care Follow Up)     Subjective:      History of Present Illness {CC  Problem List  Visit  Diagnosis   Encounters  Notes  Medications  Labs  Result Review Imaging  Media :23}     Stephanie Ferro presents to [facility] for:      URI: went to  on 1/7/2023  3 days ST, cough - started on omnicef. States was told she would not be contagious after one day of abx.  States she only had LGF briefly.     URI   This is a new problem. The current episode started in the past 7 days. The problem has been waxing and waning. Associated symptoms include congestion, coughing and a sore throat. Pertinent negatives include no chest pain or wheezing. Treatments tried: daquil and nyquil  The treatment provided moderate relief.          I have reviewed patient's medical history, any new submitted information provided by patient or medical assistant and updated medical record.      Objective:      Physical Exam  Vitals reviewed.   Constitutional:       Appearance: Normal appearance. She is well-developed.   HENT:      Head:      Comments: Wearing mask due to COVID      Mouth/Throat:      Pharynx: No oropharyngeal exudate.      Comments: Trace injection  Neck:      Thyroid: No thyromegaly.   Cardiovascular:      Rate and Rhythm: Normal rate and regular rhythm.      Pulses: Normal pulses.      Heart sounds: Normal heart sounds.   Pulmonary:      Effort: Pulmonary effort is normal.      Breath sounds: Normal breath sounds. No wheezing or rhonchi.      Comments: E/U   Musculoskeletal:      Cervical back: Neck supple.   Lymphadenopathy:      Cervical: No cervical adenopathy.   Skin:     Capillary Refill: Capillary refill takes 2 to 3 seconds.   Neurological:      Mental Status: She is alert and oriented to person, place, and time.   Psychiatric:         Behavior: Behavior is cooperative.        Result Review  Data Reviewed:{ Labs  Result Review  Imaging  Med  Tab  Media :23}                Vital Signs:   /78 (BP Location: Left arm, Patient Position: Sitting, Cuff Size: Adult)   Pulse 90   Ht 157.5 cm (62\")   Wt 50.8 kg (112 lb)   SpO2 95%   BMI 20.49 kg/m²         Requested Prescriptions     Signed Prescriptions Disp Refills   • benzonatate (TESSALON) 100 MG capsule 30 capsule 0     Sig: Take 1 capsule by mouth 3 (Three) Times a Day As Needed for Cough.       Routine medications provided by this office will also be refilled via pharmacy request.       Current Outpatient Medications:   •  cefdinir (OMNICEF) 300 MG capsule, Take 1 capsule by mouth 2 (Two) Times a Day., Disp: 14 capsule, Rfl: 0  •  sertraline (ZOLOFT) 100 MG tablet, TAKE ONE TABLET BY MOUTH DAILY, Disp: 90 tablet, Rfl: 1  •  traZODone (DESYREL) 50 MG tablet, Take 1/2 to 1 tablet nightly as needed for insomnia., Disp: 90 tablet, Rfl: 1  •  benzonatate (TESSALON) 100 MG capsule, Take 1 capsule by mouth 3 (Three) Times a Day As Needed for Cough., Disp: 30 capsule, Rfl: 0     Assessment and Plan:      Assessment and Plan {CC Problem List  Visit Diagnosis  ROS  Review (Popup)  Health Maintenance  Quality  BestPractice  Medications  SmartSets  SnapShot Encounters  Media :23}     Problem List Items Addressed This Visit    None  Visit Diagnoses     Acute cough    -  Primary    Relevant Medications    benzonatate (TESSALON) 100 MG capsule    Upper respiratory tract infection, unspecified type            Symptoms seem more consistent with viral infection. She has already started omnicef - given her  in immunocompromised, will continue.     Continue dayquil / nyquil to palliate symptoms.       Continue warm salt water rinses/gargle     Plans on returning to work: will mask for several days but she is generally not in close contact with others at work.     Follow Up {Instructions Charge Capture  Follow-up Communications :23}     Return if symptoms worsen or fail to  improve.      Patient was given instructions and counseling regarding her condition or for health maintenance advice. Please see specific information pulled into the AVS if appropriate.    [employer] disclaimer:   Much of this encounter note is an electronic transcription/translation of spoken language to printed text. The electronic translation of spoken language may permit erroneous, or at times, nonsensical words or phrases to be inadvertently transcribed; Although I have reviewed the note for such errors, some may still exist.     Additional Patient Counseling:       Patient Instructions   Honey and Lemon Tea for cough  • 1 Tbsp lemon juice   • 2 Tbsp honey   • 1/2 cup or more of hot water  Directions:   Put honey and lemon juice into a tea cup or mug. Add hot water and stir. Add more lemon juice, honey, or hot water to taste.  Sip on this and have two or three per day while cough is present.     Not for children less than 2 years of age.   Caution if diabetic.

## 2023-01-09 NOTE — PATIENT INSTRUCTIONS
Honey and Lemon Tea for cough  1 Tbsp lemon juice   2 Tbsp honey   1/2 cup or more of hot water  Directions:   Put honey and lemon juice into a tea cup or mug. Add hot water and stir. Add more lemon juice, honey, or hot water to taste.  Sip on this and have two or three per day while cough is present.     Not for children less than 2 years of age.   Caution if diabetic.

## 2023-05-30 ENCOUNTER — OFFICE VISIT (OUTPATIENT)
Dept: INTERNAL MEDICINE | Facility: CLINIC | Age: 68
End: 2023-05-30

## 2023-05-30 VITALS
OXYGEN SATURATION: 98 % | HEART RATE: 72 BPM | BODY MASS INDEX: 20.61 KG/M2 | DIASTOLIC BLOOD PRESSURE: 62 MMHG | HEIGHT: 62 IN | SYSTOLIC BLOOD PRESSURE: 108 MMHG | WEIGHT: 112 LBS

## 2023-05-30 DIAGNOSIS — F32.A ANXIETY AND DEPRESSION: Chronic | ICD-10-CM

## 2023-05-30 DIAGNOSIS — E78.5 HYPERLIPIDEMIA, UNSPECIFIED HYPERLIPIDEMIA TYPE: Primary | Chronic | ICD-10-CM

## 2023-05-30 DIAGNOSIS — M81.0 OSTEOPOROSIS, UNSPECIFIED OSTEOPOROSIS TYPE, UNSPECIFIED PATHOLOGICAL FRACTURE PRESENCE: ICD-10-CM

## 2023-05-30 DIAGNOSIS — M54.12 CERVICAL RADICULOPATHY: ICD-10-CM

## 2023-05-30 DIAGNOSIS — G47.00 INSOMNIA, UNSPECIFIED TYPE: Chronic | ICD-10-CM

## 2023-05-30 DIAGNOSIS — F41.9 ANXIETY AND DEPRESSION: Chronic | ICD-10-CM

## 2023-05-30 DIAGNOSIS — Z23 NEED FOR PNEUMOCOCCAL 20-VALENT CONJUGATE VACCINATION: ICD-10-CM

## 2023-05-30 DIAGNOSIS — Z00.00 MEDICARE ANNUAL WELLNESS VISIT, SUBSEQUENT: ICD-10-CM

## 2023-05-30 NOTE — PATIENT INSTRUCTIONS
Medicare Wellness  Personal Prevention Plan of Service     Date of Office Visit:    Encounter Provider:  Niall Kaplan III, NP-C  Place of Service:  [facility]  Patient Name: Stephanie Ferro  :  1955    As part of the Medicare Wellness portion of your visit today, we are providing you with this personalized preventive plan of services (PPPS). This plan is based upon recommendations of the [place] Preventive Services Task Force (USPSTF) and the Advisory Committee on Immunization Practices (ACIP).    This lists the preventive care services that should be considered, and provides dates of when you are due. Items listed as completed are up-to-date and do not require any further intervention.    Health Maintenance   Topic Date Due    ZOSTER VACCINE (2 of 3) 2017    Pneumococcal Vaccine 65+ (2 - PCV) 10/19/2021    ANNUAL WELLNESS VISIT  2022    PAP SMEAR  10/03/2022    LIPID PANEL  2022    DXA SCAN  2023    MAMMOGRAM  06/15/2023    INFLUENZA VACCINE  2023    COLORECTAL CANCER SCREENING  2028    HEPATITIS C SCREENING  Completed    COVID-19 Vaccine  Completed    TDAP/TD VACCINES  Discontinued       Orders Placed This Encounter   Procedures    DEXA Bone Density Axial     Order Specific Question:   Is patient taking or have taken long term Glucocorticoid (steroids)?     Answer:   No     Order Specific Question:   Does the patient have rheumatoid arthritis?     Answer:   No     Order Specific Question:   Does the patient have secondary osteoporosis?     Answer:   No     Order Specific Question:   Reason for Exam:     Answer:   OP     Order Specific Question:   Does this patient have a diabetic monitoring/medication delivering device on?     Answer:   No     Order Specific Question:   Release to patient     Answer:   Routine Release    Pneumococcal Conjugate Vaccine 20-Valent All    Comprehensive Metabolic Panel     Order Specific  Question:   Release to patient     Answer:   Routine Release    Lipid Panel With LDL / HDL Ratio     Order Specific Question:   Release to patient     Answer:   Routine Release    CBC (No Diff)     Order Specific Question:   Release to patient     Answer:   Routine Release    Vitamin D 25 hydroxy     Order Specific Question:   Release to patient     Answer:   Routine Release       Return in about 1 year (around 5/30/2024) for Medicare Wellness.

## 2023-05-30 NOTE — PROGRESS NOTES
The ABCs of the Annual Wellness Visit  Subsequent Medicare Wellness Visit    Subjective    Stephanie Ferro is a 67 y.o. female who presents for a Subsequent Medicare Wellness Visit.    The following portions of the patient's history were reviewed and   updated as appropriate: allergies, current medications, past family history, past medical history, past social history, past surgical history and problem list.    Wt Readings from Last 4 Encounters:   05/30/23 50.8 kg (112 lb)   01/09/23 50.8 kg (112 lb)   01/07/23 49 kg (108 lb)   09/26/22 50.3 kg (111 lb)       Weight trend is stable.    Her cardiovascular risks are:    [] No Known risk factors    [] Hypertension    [x] Hyperlipidemia  [] Diabetes     [] Obesity  [x] Family history    [] Current or hx tobacco use  [] Sedentary lifestyle         Mobility    [x] Ambulates independently  [] Ambulates with cane   [] Ambulates with quad cane  [] Ambulates with rollator   [] Ambulates with walker   [] Mobile with wheelchair   [] Mobile with electric wheelchair     Continence    [x] Continent of bowel and bladder  [] Incontinent bowel and bladder   [] Incontinent bladder   [] Incontinent bowel          Compared to one year ago, the patient feels her physical   health is the same.    Compared to one year ago, the patient feels her mental   health is the same.    Recent Hospitalizations:  She was not admitted to the hospital during the last year.       Current Medical Providers:  Patient Care Team:  Niall Kaplan III NP-C as PCP - General (Internal Medicine)    Outpatient Medications Prior to Visit   Medication Sig Dispense Refill   • sertraline (ZOLOFT) 100 MG tablet TAKE ONE TABLET BY MOUTH DAILY 90 tablet 1   • traZODone (DESYREL) 50 MG tablet Take 1/2 to 1 tablet nightly as needed for insomnia. 90 tablet 1   • benzonatate (TESSALON) 100 MG capsule Take 1 capsule by mouth 3 (Three) Times a Day As Needed for Cough. 30 capsule 0   • cefdinir (OMNICEF) 300 MG  "capsule Take 1 capsule by mouth 2 (Two) Times a Day. 14 capsule 0     No facility-administered medications prior to visit.       No opioid medication identified on active medication list. I have reviewed chart for other potential  high risk medication/s and harmful drug interactions in the elderly.          Aspirin is not on active medication list.  Aspirin use is not indicated based on review of current medical condition/s. Risk of harm outweighs potential benefits.  .    Patient Active Problem List   Diagnosis   • Osteoporosis   • Hyperlipidemia   • Anxiety and depression   • FH: premature coronary heart disease   • Colon cancer screening   • Insomnia   • Canker sore   • Hordeolum externum of left lower eyelid   • Skin lesion     Advance Care Planning  Advance Directive is not on file.  ACP discussion was held with the patient during this visit. Patient does not have an advance directive, declines further assistance.     Objective    Vitals:    23 1524   BP: 108/62   BP Location: Left arm   Patient Position: Sitting   Cuff Size: Adult   Pulse: 72   SpO2: 98%   Weight: 50.8 kg (112 lb)   Height: 157.5 cm (62\")     Estimated body mass index is 20.49 kg/m² as calculated from the following:    Height as of this encounter: 157.5 cm (62\").    Weight as of this encounter: 50.8 kg (112 lb).    BMI is within normal parameters. No other follow-up for BMI required.      Does the patient have evidence of cognitive impairment? No          HEALTH RISK ASSESSMENT    Smoking Status:  Social History     Tobacco Use   Smoking Status Some Days   • Packs/day: 0.15   • Years: 20.00   • Pack years: 3.00   • Types: Cigarettes   • Last attempt to quit:    • Years since quittin.4   Smokeless Tobacco Never     Alcohol Consumption:  Social History     Substance and Sexual Activity   Alcohol Use No     Fall Risk Screen:    STEADI Fall Risk Assessment was completed, and patient is at HIGH risk for falls. Assessment completed " on:2023    Depression Screenin/30/2023     3:28 PM   PHQ-2/PHQ-9 Depression Screening   Little Interest or Pleasure in Doing Things 0-->not at all   Feeling Down, Depressed or Hopeless 0-->not at all   PHQ-9: Brief Depression Severity Measure Score 0       Health Habits and Functional and Cognitive Screenin/30/2023     3:25 PM   Functional & Cognitive Status   Do you have difficulty preparing food and eating? No   Do you have difficulty bathing yourself, getting dressed or grooming yourself? No   Do you have difficulty using the toilet? No   Do you have difficulty moving around from place to place? No   Do you have trouble with steps or getting out of a bed or a chair? No   Current Diet Well Balanced Diet   Dental Exam Up to date   Eye Exam Not up to date   Exercise (times per week) 3 times per week   Current Exercises Include Cardiovascular Workout   Do you need help using the phone?  No   Are you deaf or do you have serious difficulty hearing?  No   Do you need help with transportation? No   Do you need help shopping? No   Do you need help preparing meals?  No   Do you need help with housework?  No   Do you need help with laundry? No   Do you need help taking your medications? No   Do you need help managing money? No   Do you ever drive or ride in a car without wearing a seat belt? No   Have you felt unusual stress, anger or loneliness in the last month? No   Who do you live with? Spouse   If you need help, do you have trouble finding someone available to you? No   Have you been bothered in the last four weeks by sexual problems? No   Do you have difficulty concentrating, remembering or making decisions? No       Age-appropriate Screening Schedule:  Refer to the list below for future screening recommendations based on patient's age, sex and/or medical conditions. Orders for these recommended tests are listed in the plan section. The patient has been provided with a written plan.    Health  Maintenance   Topic Date Due   • ZOSTER VACCINE (2 of 3) 07/03/2017   • ANNUAL WELLNESS VISIT  05/04/2022   • PAP SMEAR  10/03/2022   • LIPID PANEL  11/03/2022   • DXA SCAN  05/04/2023   • MAMMOGRAM  06/15/2023   • INFLUENZA VACCINE  08/01/2023   • COLORECTAL CANCER SCREENING  07/03/2028   • HEPATITIS C SCREENING  Completed   • COVID-19 Vaccine  Completed   • Pneumococcal Vaccine 65+  Completed   • TDAP/TD VACCINES  Discontinued                CMS Preventative Services Quick Reference  Risk Factors Identified During Encounter  Immunizations Discussed/Encouraged: Prevnar 20 (Pneumococcal 20-valent conjugate)      The above risks/problems have been discussed with the patient.  Pertinent information has been shared with the patient in the After Visit Summary.  An After Visit Summary and PPPS were made available to the patient.    Follow Up:   Next Medicare Wellness visit to be scheduled in 1 year.       Additional E&M Note during same encounter follows:  Patient has multiple medical problems which are significant and separately identifiable that require additional work above and beyond the Medicare Wellness Visit.      Chief Complaint  Medicare Wellness-subsequent    Subjective        Stephanie Ferro is also being seen today for AWV and follow up chronic conditions:     Hyperlipidemia  This is a chronic problem. The current episode started more than 1 year ago. The problem is controlled. She has no history of diabetes or obesity. There are no known factors aggravating her hyperlipidemia. Pertinent negatives include no chest pain, leg pain or shortness of breath. Current antihyperlipidemic treatment includes exercise and diet change. The current treatment provides mild improvement of lipids. There are no compliance problems.  Risk factors for coronary artery disease include family history and post-menopausal.   Anxiety  Presents for follow-up visit. Patient reports no chest pain, irritability, malaise, nervous/anxious  "behavior or shortness of breath. Symptoms occur rarely. The severity of symptoms is mild. The quality of sleep is good. Nighttime awakenings: occasional.     Her past medical history is significant for depression.   Depression  Visit Type: follow-up  Patient is not experiencing: irritability, malaise, nervousness/anxiety and shortness of breath.  Frequency of symptoms: rarely   Severity: mild   Sleep quality: good  Nighttime awakenings: occasional        States right shoulder pain that she has had for years but got worse last summer. States pain was worse after knitting (no longer knits). Was given referral to PT, completed, but did not follow through with exercises. She states she is willing to continue home exercises, but is unsure of what they recommended. Denies any treatment for pain at home. Denies neck pain, numbness, tingling or weakness to the hands. Describes pain as aching, sharp pain    Depression/Anxiety- Denies insomnia or symptoms. States missed a few doses and felt \"down\" but once restarted, symptoms improved.     Exercise- normally 2-3 times per week but has not this month  Diet- generally healthy. Cutting down on red meat    Review of Systems   Constitutional: Negative for chills, fever, irritability and unexpected weight change.   Respiratory: Negative for shortness of breath.    Cardiovascular: Negative for chest pain and leg swelling.   Musculoskeletal:        Right shoulder pain   Psychiatric/Behavioral: The patient is not nervous/anxious.         Objective   Vital Signs:  /62 (BP Location: Left arm, Patient Position: Sitting, Cuff Size: Adult)   Pulse 72   Ht 157.5 cm (62\")   Wt 50.8 kg (112 lb)   SpO2 98%   BMI 20.49 kg/m²     Physical Exam  Vitals reviewed.   Constitutional:       Appearance: Normal appearance. She is well-developed.   Neck:      Thyroid: No thyromegaly.   Cardiovascular:      Rate and Rhythm: Normal rate and regular rhythm.      Pulses: Normal pulses.      Heart " sounds: Normal heart sounds.   Pulmonary:      Effort: Pulmonary effort is normal.      Breath sounds: Normal breath sounds.      Comments: E/U   Musculoskeletal:         General: Normal range of motion.      Right shoulder: No swelling, deformity or tenderness. Normal range of motion. Normal strength.      Cervical back: Normal range of motion and neck supple.      Comments: Pain that starts at the shoulder/cervical line, and radiates down top of arm. Denies weakness to arm or hand. Denies pain with ROM in neck. Pain/aching with flexion of shoulder.    Lymphadenopathy:      Cervical: No cervical adenopathy.   Skin:     General: Skin is warm and dry.      Capillary Refill: Capillary refill takes 2 to 3 seconds.   Neurological:      Mental Status: She is alert and oriented to person, place, and time.   Psychiatric:         Mood and Affect: Mood normal.         Behavior: Behavior normal. Behavior is cooperative.          The following data was reviewed by: Niall Kaplan III, NP-C on 05/30/2023:                 Assessment and Plan     Problem List Items Addressed This Visit        Cardiac and Vasculature    Hyperlipidemia - Primary (Chronic)    Current Assessment & Plan     Lipid abnormalities are unchanged.  Nutritional counseling was provided.  Lipids will be reassessed in 6 months.         Relevant Orders    Comprehensive Metabolic Panel    Lipid Panel With LDL / HDL Ratio       Mental Health    Anxiety and depression (Chronic)    Overview     Since 1987          Current Assessment & Plan     Patient's depression is recurrent and is mild without psychosis. Their depression is currently in partial remission and the condition is unchanged. This will be reassessed at the next regular appointment. F/U as described:patient will continue current medication therapy.         Relevant Orders    CBC (No Diff)       Musculoskeletal and Injuries    Osteoporosis    Overview     Prior treatment:  Fosamax  (stopped  2021)          Current Assessment & Plan     Recently cut out dairy and stopped calcium with D supplement. Educated to restart supplement.          Relevant Orders    DEXA Bone Density Axial    Vitamin D 25 hydroxy       Sleep    Insomnia (Chronic)    Current Assessment & Plan     Continues trazodone         Other Visit Diagnoses     Medicare annual wellness visit, subsequent        Need for pneumococcal 20-valent conjugate vaccination        Relevant Orders    Pneumococcal Conjugate Vaccine 20-Valent All (Completed)    Cervical radiculopathy        Education on exercises. Declines PT at this time. Will attempt home exercises. Educated to notify if no relief.            She describes more radicular pain to right shoulder - states in the past Dr Condon did x-ray and had some degerative changes.  No injury.   No neck pain.    At this time - she opts to do home exercise and will let me know if not better, can get xray and refer to PT if she changes her mind.              Follow Up     Return in about 1 year (around 5/30/2024) for Medicare Wellness.    Patient was given instructions and counseling regarding her condition or for health maintenance advice. Please see specific information pulled into the AVS if appropriate.

## 2023-05-31 LAB
25(OH)D3+25(OH)D2 SERPL-MCNC: 32.5 NG/ML (ref 30–100)
ALBUMIN SERPL-MCNC: 4.6 G/DL (ref 3.8–4.8)
ALBUMIN/GLOB SERPL: 2.3 {RATIO} (ref 1.2–2.2)
ALP SERPL-CCNC: 66 IU/L (ref 44–121)
ALT SERPL-CCNC: 10 IU/L (ref 0–32)
AST SERPL-CCNC: 16 IU/L (ref 0–40)
BILIRUB SERPL-MCNC: <0.2 MG/DL (ref 0–1.2)
BUN SERPL-MCNC: 24 MG/DL (ref 8–27)
BUN/CREAT SERPL: 27 (ref 12–28)
CALCIUM SERPL-MCNC: 9.4 MG/DL (ref 8.7–10.3)
CHLORIDE SERPL-SCNC: 105 MMOL/L (ref 96–106)
CHOLEST SERPL-MCNC: 212 MG/DL (ref 100–199)
CO2 SERPL-SCNC: 24 MMOL/L (ref 20–29)
CREAT SERPL-MCNC: 0.88 MG/DL (ref 0.57–1)
EGFRCR SERPLBLD CKD-EPI 2021: 72 ML/MIN/1.73
ERYTHROCYTE [DISTWIDTH] IN BLOOD BY AUTOMATED COUNT: 12.1 % (ref 11.7–15.4)
GLOBULIN SER CALC-MCNC: 2 G/DL (ref 1.5–4.5)
GLUCOSE SERPL-MCNC: 90 MG/DL (ref 70–99)
HCT VFR BLD AUTO: 41.2 % (ref 34–46.6)
HDLC SERPL-MCNC: 47 MG/DL
HGB BLD-MCNC: 13.8 G/DL (ref 11.1–15.9)
LDLC SERPL CALC-MCNC: 121 MG/DL (ref 0–99)
LDLC/HDLC SERPL: 2.6 RATIO (ref 0–3.2)
MCH RBC QN AUTO: 29.5 PG (ref 26.6–33)
MCHC RBC AUTO-ENTMCNC: 33.5 G/DL (ref 31.5–35.7)
MCV RBC AUTO: 88 FL (ref 79–97)
PLATELET # BLD AUTO: 216 X10E3/UL (ref 150–450)
POTASSIUM SERPL-SCNC: 4.1 MMOL/L (ref 3.5–5.2)
PROT SERPL-MCNC: 6.6 G/DL (ref 6–8.5)
RBC # BLD AUTO: 4.68 X10E6/UL (ref 3.77–5.28)
SODIUM SERPL-SCNC: 143 MMOL/L (ref 134–144)
TRIGL SERPL-MCNC: 254 MG/DL (ref 0–149)
VLDLC SERPL CALC-MCNC: 44 MG/DL (ref 5–40)
WBC # BLD AUTO: 6.8 X10E3/UL (ref 3.4–10.8)

## 2023-08-30 ENCOUNTER — HOSPITAL ENCOUNTER (OUTPATIENT)
Dept: BONE DENSITY | Facility: HOSPITAL | Age: 68
Discharge: HOME | End: 2023-08-30
Admitting: NURSE PRACTITIONER
Payer: MEDICARE

## 2023-08-30 PROCEDURE — 77080 DXA BONE DENSITY AXIAL: CPT

## 2023-09-01 ENCOUNTER — OFFICE VISIT (OUTPATIENT)
Dept: INTERNAL MEDICINE | Facility: CLINIC | Age: 68
End: 2023-09-01
Payer: MEDICARE

## 2023-09-01 VITALS
OXYGEN SATURATION: 96 % | DIASTOLIC BLOOD PRESSURE: 70 MMHG | HEIGHT: 62 IN | BODY MASS INDEX: 20.24 KG/M2 | WEIGHT: 110 LBS | HEART RATE: 60 BPM | SYSTOLIC BLOOD PRESSURE: 110 MMHG

## 2023-09-01 DIAGNOSIS — L23.7 POISON IVY DERMATITIS: Primary | ICD-10-CM

## 2023-09-01 RX ORDER — METHYLPREDNISOLONE SODIUM SUCCINATE 125 MG/2ML
125 INJECTION, POWDER, LYOPHILIZED, FOR SOLUTION INTRAMUSCULAR; INTRAVENOUS ONCE
Status: DISCONTINUED | OUTPATIENT
Start: 2023-09-01 | End: 2023-09-01

## 2023-09-01 RX ORDER — METHYLPREDNISOLONE SODIUM SUCCINATE 125 MG/2ML
125 INJECTION, POWDER, LYOPHILIZED, FOR SOLUTION INTRAMUSCULAR; INTRAVENOUS ONCE
Status: COMPLETED | OUTPATIENT
Start: 2023-09-01 | End: 2023-09-01

## 2023-09-01 RX ORDER — METHYLPREDNISOLONE 4 MG/1
TABLET ORAL
Qty: 21 EACH | Refills: 0 | Status: SHIPPED | OUTPATIENT
Start: 2023-09-01

## 2023-09-01 RX ORDER — FAMOTIDINE 20 MG/1
20 TABLET, FILM COATED ORAL 2 TIMES DAILY
Qty: 14 TABLET | Refills: 0 | Status: SHIPPED | OUTPATIENT
Start: 2023-09-01

## 2023-09-01 RX ADMIN — METHYLPREDNISOLONE SODIUM SUCCINATE 125 MG: 125 INJECTION, POWDER, LYOPHILIZED, FOR SOLUTION INTRAMUSCULAR; INTRAVENOUS at 10:20

## 2023-09-01 NOTE — PROGRESS NOTES
"Chief Complaint  Rash (Poison Ivy) and Itching    Subjective        Stephanie Ferro presents to [facility]  Rash    Itching  Associated symptoms include a rash.   This is a 66 y/o female presenting to office for complaints of rash associated with poison ivy. Patient reports she was gardening and exposed early this week. Reports she noticed the rash started 48 hours ago. Reports she is not taking any OTC medication. Patient reports she is using some OTC analgesic to help with the itching. Reports erythematic papular wide spread rash to hands, arms, legs, stomach, neck, and ear.     Objective   Vital Signs:  /70 (BP Location: Left arm, Patient Position: Sitting, Cuff Size: Adult)   Pulse 60   Ht 157.5 cm (62\")   Wt 49.9 kg (110 lb)   SpO2 96%   BMI 20.12 kg/mý   Estimated body mass index is 20.12 kg/mý as calculated from the following:    Height as of this encounter: 157.5 cm (62\").    Weight as of this encounter: 49.9 kg (110 lb).       BMI is within normal parameters. No other follow-up for BMI required.      Physical Exam  Constitutional:       Appearance: Normal appearance.   HENT:      Head: Normocephalic and atraumatic.      Nose: Nose normal.      Mouth/Throat:      Mouth: Mucous membranes are moist.      Pharynx: Oropharynx is clear.   Eyes:      Conjunctiva/sclera: Conjunctivae normal.      Pupils: Pupils are equal, round, and reactive to light.   Pulmonary:      Effort: Pulmonary effort is normal.   Musculoskeletal:      Cervical back: Normal range of motion and neck supple.   Skin:     Findings: Erythema, lesion and rash present.          Neurological:      General: No focal deficit present.      Mental Status: She is alert and oriented to person, place, and time. Mental status is at baseline.   Psychiatric:         Mood and Affect: Mood normal.         Thought Content: Thought content normal.         Judgment: Judgment normal.      Result Review :  The following data " was reviewed by: DARRYL Flores on 09/01/2023:  Common labs          5/30/2023    16:35   Common Labs   Glucose 90    BUN 24    Creatinine 0.88    Sodium 143    Potassium 4.1    Chloride 105    Calcium 9.4    Total Protein 6.6    Albumin 4.6    Total Bilirubin <0.2    Alkaline Phosphatase 66    AST (SGOT) 16    ALT (SGPT) 10    WBC 6.8    Hemoglobin 13.8    Hematocrit 41.2    Platelets 216    Total Cholesterol 212    Triglycerides 254    HDL Cholesterol 47    LDL Cholesterol  121                   Assessment and Plan   Diagnoses and all orders for this visit:    1. Poison ivy dermatitis (Primary)  Assessment & Plan:  Solumedrol injection in office.   Medrol pack as ordered.   Antihistamine OTC daily x 7 days  Pepcid BID x 7 days  Calamine or oatmeal baths PRN      Orders:  -     famotidine (Pepcid) 20 MG tablet; Take 1 tablet by mouth 2 (Two) Times a Day.  Dispense: 14 tablet; Refill: 0  -     methylPREDNISolone (MEDROL) 4 MG dose pack; Take as directed on package instructions.  Dispense: 21 each; Refill: 0  -     Discontinue: methylPREDNISolone sodium succinate (SOLU-Medrol) injection 125 mg  -     methylPREDNISolone sodium succinate (SOLU-Medrol) injection 125 mg             Follow Up   Return if symptoms worsen or fail to improve.  Patient was given instructions and counseling regarding her condition or for health maintenance advice. Please see specific information pulled into the AVS if appropriate.

## 2023-09-01 NOTE — ASSESSMENT & PLAN NOTE
Solumedrol injection in office.   Medrol pack as ordered.   Antihistamine OTC daily x 7 days  Pepcid BID x 7 days  Calamine or oatmeal baths PRN

## 2023-09-18 DIAGNOSIS — F41.9 ANXIETY AND DEPRESSION: ICD-10-CM

## 2023-09-18 DIAGNOSIS — F32.A ANXIETY AND DEPRESSION: ICD-10-CM

## 2023-09-18 DIAGNOSIS — M81.6 LOCALIZED OSTEOPOROSIS WITHOUT CURRENT PATHOLOGICAL FRACTURE: Primary | ICD-10-CM

## 2023-09-18 RX ORDER — ALENDRONATE SODIUM 70 MG/1
70 TABLET ORAL
Qty: 12 TABLET | Refills: 1 | Status: SHIPPED | OUTPATIENT
Start: 2023-09-18

## 2023-09-18 RX ORDER — SERTRALINE HYDROCHLORIDE 100 MG/1
100 TABLET, FILM COATED ORAL DAILY
Qty: 90 TABLET | Refills: 1 | Status: SHIPPED | OUTPATIENT
Start: 2023-09-18

## 2023-09-18 NOTE — TELEPHONE ENCOUNTER
Rx Refill Note  Requested Prescriptions     Pending Prescriptions Disp Refills    sertraline (ZOLOFT) 100 MG tablet 90 tablet 1     Sig: Take 1 tablet by mouth Daily.      Last office visit with prescribing clinician: 5/30/2023   Last telemedicine visit with prescribing clinician: Visit date not found   Next office visit with prescribing clinician: 6/4/2024         Jacque Poon MA  09/18/23, 15:38 EDT

## 2023-10-19 ENCOUNTER — TELEPHONE (OUTPATIENT)
Dept: INTERNAL MEDICINE | Facility: CLINIC | Age: 68
End: 2023-10-19
Payer: MEDICARE

## 2023-10-19 ENCOUNTER — TELEMEDICINE (OUTPATIENT)
Dept: INTERNAL MEDICINE | Facility: CLINIC | Age: 68
End: 2023-10-19
Payer: MEDICARE

## 2023-10-19 DIAGNOSIS — U07.1 COVID: Primary | ICD-10-CM

## 2023-10-19 NOTE — PATIENT INSTRUCTIONS
Recommend isolating in place for 5 days after positive test.  After those 5 days, wear a mask around others for 5 more days.  Stay hydrated with water.  Recommend Tylenol for fever and regular Mucinex for congestion.    If difficulty breathing or shortness of breath occurs, recommend ER for further evaluation.

## 2023-10-19 NOTE — TELEPHONE ENCOUNTER
Caller: Stephanie Ferro    Relationship: Self    Best call back number: [phone]     Who are you requesting to speak with (clinical staff, provider,  specific staff member): CLINICAL STAFF    What was the call regarding: PATIENT STATES THAT SHE DEVELOPED COVID SYMPTOMS STARTING LAST FRIDAY 10/13/23. SHE INITIALLY POSITIVE THAT SUNDAY. SHE HAS BEEN FEVER FREE FOR 36 HOURS, BUT STILL TESTING POSITIVE. REQUESTS A CALL BACK TO DISCUSS IF SHE IS STILL CONSIDERED CONTAGIOUS

## 2023-10-19 NOTE — PROGRESS NOTES
"Chief Complaint  No chief complaint on file.    Subjective        Stephanie Ferro presents to [facility]  History of Present Illness  67-year-old female presenting via video visit for with positive COVID.  Patient ZOHAIB Mishra.  Tested positive for COVID on Gerald 10/15 while in [place].  Return to home on Tuesday.  Has been fever free for 36 hours but tested positive with home test this morning.  He is looking for guidance regarding being contagious due to family coming into the town.  Denies fever, chest congestion, sinus congestion, difficulty breathing, change in taste or smell.      Objective   Vital Signs:  There were no vitals taken for this visit.  Estimated body mass index is 20.12 kg/m² as calculated from the following:    Height as of 9/1/23: 157.5 cm (62\").    Weight as of 9/1/23: 49.9 kg (110 lb).       BMI is within normal parameters. No other follow-up for BMI required.      Physical Exam  Vitals (Unable to perform physical exam due to video visit.) reviewed.   Constitutional:       Appearance: Normal appearance.   HENT:      Head: Normocephalic.   Skin:     General: Skin is warm.   Neurological:      Mental Status: She is alert.        Result Review :    Common labs          5/30/2023    16:35   Common Labs   Glucose 90    BUN 24    Creatinine 0.88    Sodium 143    Potassium 4.1    Chloride 105    Calcium 9.4    Total Protein 6.6    Albumin 4.6    Total Bilirubin <0.2    Alkaline Phosphatase 66    AST (SGOT) 16    ALT (SGPT) 10    WBC 6.8    Hemoglobin 13.8    Hematocrit 41.2    Platelets 216    Total Cholesterol 212    Triglycerides 254    HDL Cholesterol 47    LDL Cholesterol  121          Current Outpatient Medications on File Prior to Visit   Medication Sig Dispense Refill    alendronate (Fosamax) 70 MG tablet Take 1 tablet by mouth Every 7 (Seven) Days. 12 tablet 1    famotidine (Pepcid) 20 MG tablet Take 1 tablet by mouth 2 (Two) Times a Day. 14 tablet 0    " methylPREDNISolone (MEDROL) 4 MG dose pack Take as directed on package instructions. 21 each 0    sertraline (ZOLOFT) 100 MG tablet Take 1 tablet by mouth Daily. 90 tablet 1    traZODone (DESYREL) 50 MG tablet TAKE 1/2 TABLET TO ONE TABLET BY MOUTH ONCE NIGHTLY AS NEEDED FOR INSOMNIA 90 tablet 1     No current facility-administered medications on file prior to visit.              Assessment and Plan   Diagnoses and all orders for this visit:    1. COVID (Primary)      Patient Instructions   Recommend isolating in place for 5 days after positive test.  After those 5 days, wear a mask around others for 5 more days.  Stay hydrated with water.  Recommend Tylenol for fever and regular Mucinex for congestion.    If difficulty breathing or shortness of breath occurs, recommend ER for further evaluation.    You have chosen to receive care through a telehealth visit.  Do you consent to use a video/audio connection for your medical care today? Yes    During visit, patient was at her residence and provider at medical practice office.       I spent 17 minutes caring for Stephanie on this date of service. This time includes time spent by me in the following activities:preparing for the visit, reviewing tests, obtaining and/or reviewing a separately obtained history, counseling and educating the patient/family/caregiver, and documenting information in the medical record  Follow Up   Return if symptoms worsen or fail to improve.  Patient was given instructions and counseling regarding her condition or for health maintenance advice. Please see specific information pulled into the AVS if appropriate.

## 2024-03-10 DIAGNOSIS — M81.6 LOCALIZED OSTEOPOROSIS WITHOUT CURRENT PATHOLOGICAL FRACTURE: ICD-10-CM

## 2024-03-11 RX ORDER — ALENDRONATE SODIUM 70 MG/1
70 TABLET ORAL WEEKLY
Qty: 12 TABLET | Refills: 1 | Status: SHIPPED | OUTPATIENT
Start: 2024-03-11

## 2024-06-06 PROBLEM — Z12.11 COLON CANCER SCREENING: Status: RESOLVED | Noted: 2018-06-05 | Resolved: 2024-06-06

## 2024-06-06 PROBLEM — L23.7 POISON IVY DERMATITIS: Status: RESOLVED | Noted: 2023-09-01 | Resolved: 2024-06-06

## 2024-06-06 PROBLEM — H00.015 HORDEOLUM EXTERNUM OF LEFT LOWER EYELID: Status: RESOLVED | Noted: 2022-08-18 | Resolved: 2024-06-06

## 2024-06-06 PROBLEM — K12.0 CANKER SORE: Status: RESOLVED | Noted: 2022-08-18 | Resolved: 2024-06-06

## 2024-06-06 PROBLEM — L98.9 SKIN LESION: Status: RESOLVED | Noted: 2022-08-18 | Resolved: 2024-06-06

## 2024-06-06 NOTE — PATIENT INSTRUCTIONS
Medicare Wellness  Personal Prevention Plan of Service     Date of Office Visit:    Encounter Provider:  Niall Kaplan III, NP-C  Place of Service:  [facility]  Patient Name: Stephanie Ferro  :  1955    As part of the Medicare Wellness portion of your visit today, we are providing you with this personalized preventive plan of services (PPPS). This plan is based upon recommendations of the [place] Preventive Services Task Force (USPSTF) and the Advisory Committee on Immunization Practices (ACIP).    This lists the preventive care services that should be considered, and provides dates of when you are due. Items listed as completed are up-to-date and do not require any further intervention.    Health Maintenance   Topic Date Due    RSV Vaccine - Adults (1 - 1-dose 60+ series) Never done    ZOSTER VACCINE (2 of 3) 2017    PAP SMEAR  10/03/2022    MAMMOGRAM  06/15/2023    COVID-19 Vaccine (2023-24 season) 2024    ANNUAL WELLNESS VISIT  2024    LIPID PANEL  2024    INFLUENZA VACCINE  2024    DXA SCAN  2025    COLORECTAL CANCER SCREENING  2028    HEPATITIS C SCREENING  Completed    Pneumococcal Vaccine 65+  Completed    TDAP/TD VACCINES  Discontinued       No orders of the defined types were placed in this encounter.      Return in about 1 year (around 2025) for Medicare Wellness.      Vaccines:     Shingles vaccine is given in TWO separate injections.   CDC recommends that healthy adults 50 years and older get two doses of the shingles vaccine called Shingrix (recombinant zoster vaccine),  by 2 to 6 months, to prevent shingles and the complications from the disease.          Today we have placed a referral or ordered further testing:     A team member from [facility] will contact you within the next 3-5 business days to schedule your tests or referral.    If you have not heard them within  this time frame, they can be contacted at [phone]    If it was an outside referral: contact our office if you have not been contacted for appointment after 7-10 business days.

## 2024-06-06 NOTE — PROGRESS NOTES
The ABCs of the Annual Wellness Visit  Subsequent Medicare Wellness Visit    Subjective    Stephanie Ferro is a 68 y.o. female who presents for a Subsequent Medicare Wellness Visit.    The following portions of the patient's history were reviewed and   updated as appropriate: allergies, current medications, past family history, past medical history, past social history, past surgical history, and problem list.    Wt Readings from Last 4 Encounters:   06/07/24 52.5 kg (115 lb 12.8 oz)   09/01/23 49.9 kg (110 lb)   05/30/23 50.8 kg (112 lb)   01/09/23 50.8 kg (112 lb)       Weight trend is stable.    Her cardiovascular risks are:    [] No Known risk factors  [] Known CAD and being treated     [] Hypertension    [] Hyperlipidemia  [] Diabetes     [] Obesity  [x] Family history    [] Current or hx tobacco use  [] Sedentary lifestyle       Male:   [] Testosterone use     Mobility    [x] Ambulates independently  [] Ambulates with cane   [] Ambulates with quad cane  [] Ambulates with rollator   [] Ambulates with walker   [] Mobile with wheelchair   [] Mobile with electric wheelchair     Continence    [x] Continent of bowel and bladder  [] Incontinent bowel and bladder   [] Incontinent bladder   [] Incontinent bowel      OP:   Last Dexa: 8/30/23: next 2025    Card:   1/2022: Total coronary artery Agatston calcification score is 0     Just restarted the gym.     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     Compared to one year ago, the patient feels her physical   health is the same.    Compared to one year ago, the patient feels her mental   health is the same.    Recent Hospitalizations:  She was not admitted to the hospital during the last year.       Current Medical Providers:  Patient Care Team:  Niall Kaplan III NP-C as PCP - General (Internal Medicine)    Outpatient Medications Prior to Visit   Medication Sig Dispense Refill    alendronate (FOSAMAX) 70 MG tablet TAKE 1 TABLET BY MOUTH ONCE  "WEEKLY ON AN EMPTY STOMACH BEFORE BREAKFAST. REMAIN UPRIGHT FOR 30 MINUTES AND TAKE WITH 8 OUNCES OF WATER 12 tablet 1    sertraline (ZOLOFT) 100 MG tablet Take 1 tablet by mouth Daily. 90 tablet 1    traZODone (DESYREL) 50 MG tablet TAKE 1/2 TABLET TO ONE TABLET BY MOUTH ONCE NIGHTLY AS NEEDED FOR INSOMNIA 90 tablet 1    famotidine (Pepcid) 20 MG tablet Take 1 tablet by mouth 2 (Two) Times a Day. 14 tablet 0    methylPREDNISolone (MEDROL) 4 MG dose pack Take as directed on package instructions. 21 each 0     No facility-administered medications prior to visit.       No opioid medication identified on active medication list. I have reviewed chart for other potential  high risk medication/s and harmful drug interactions in the elderly.        Aspirin is not on active medication list.  Aspirin use is not indicated based on review of current medical condition/s. Risk of harm outweighs potential benefits.  .    Patient Active Problem List   Diagnosis    Osteoporosis    Hyperlipidemia    Anxiety and depression    FH: premature coronary heart disease    Insomnia     Advance Care Planning  Advance Directive is not on file.  ACP discussion was held with the patient during this visit. Patient has an advance directive (not in EMR), copy requested.     Objective    Vitals:    06/07/24 1254   BP: 118/82   BP Location: Left arm   Patient Position: Sitting   Cuff Size: Adult   Pulse: 73   SpO2: 99%   Weight: 52.5 kg (115 lb 12.8 oz)   Height: 157.5 cm (62\")   PainSc:   6   PainLoc: Shoulder     Estimated body mass index is 21.18 kg/m² as calculated from the following:    Height as of this encounter: 157.5 cm (62\").    Weight as of this encounter: 52.5 kg (115 lb 12.8 oz).    BMI is within normal parameters. No other follow-up for BMI required.    Does the patient have evidence of cognitive impairment? No          HEALTH RISK ASSESSMENT    Smoking Status:  Social History     Tobacco Use   Smoking Status Former    Current " packs/day: 0.00    Average packs/day: 0.2 packs/day for 20.0 years (3.0 ttl pk-yrs)    Types: Cigarettes    Start date:     Quit date: 2009    Years since quitting: 15.4   Smokeless Tobacco Never     Alcohol Consumption:  Social History     Substance and Sexual Activity   Alcohol Use No     Fall Risk Screen:    VERONIKA Fall Risk Assessment was completed, and patient is at LOW risk for falls.Assessment completed on:2024    Depression Screenin/7/2024    12:54 PM   PHQ-2/PHQ-9 Depression Screening   Little Interest or Pleasure in Doing Things 0-->not at all   Feeling Down, Depressed or Hopeless 0-->not at all   PHQ-9: Brief Depression Severity Measure Score 0       Health Habits and Functional and Cognitive Screenin/7/2024    12:55 PM   Functional & Cognitive Status   Do you have difficulty preparing food and eating? No   Do you have difficulty bathing yourself, getting dressed or grooming yourself? No   Do you have difficulty using the toilet? No   Do you have difficulty moving around from place to place? No   Do you have trouble with steps or getting out of a bed or a chair? No   Current Diet Well Balanced Diet   Dental Exam Up to date   Eye Exam Up to date   Exercise (times per week) 2 times per week   Current Exercises Include Walking   Do you need help using the phone?  No   Are you deaf or do you have serious difficulty hearing?  No   Do you need help to go to places out of walking distance? No   Do you need help shopping? No   Do you need help preparing meals?  No   Do you need help with housework?  No   Do you need help with laundry? No   Do you need help taking your medications? No   Do you need help managing money? No   Do you ever drive or ride in a car without wearing a seat belt? No   Have you felt unusual stress, anger or loneliness in the last month? No   Who do you live with? Spouse   If you need help, do you have trouble finding someone available to you? No   Have you been  bothered in the last four weeks by sexual problems? No   Do you have difficulty concentrating, remembering or making decisions? No       Age-appropriate Screening Schedule:  Refer to the list below for future screening recommendations based on patient's age, sex and/or medical conditions. Orders for these recommended tests are listed in the plan section. The patient has been provided with a written plan.    Health Maintenance   Topic Date Due    RSV Vaccine - Adults (1 - 1-dose 60+ series) Never done    ZOSTER VACCINE (2 of 3) 07/03/2017    PAP SMEAR  10/03/2022    MAMMOGRAM  06/15/2023    COVID-19 Vaccine (8 - 2023-24 season) 01/19/2024    ANNUAL WELLNESS VISIT  05/30/2024    LIPID PANEL  05/30/2024    INFLUENZA VACCINE  08/01/2024    DXA SCAN  08/30/2025    COLORECTAL CANCER SCREENING  07/03/2028    HEPATITIS C SCREENING  Completed    Pneumococcal Vaccine 65+  Completed    TDAP/TD VACCINES  Discontinued                CMS Preventative Services Quick Reference  Risk Factors Identified During Encounter  Immunizations Discussed/Encouraged: Influenza, COVID19, and RSV (Respiratory Syncytial Virus)  Also discussed shingles.       The above risks/problems have been discussed with the patient.  Pertinent information has been shared with the patient in the After Visit Summary.  An After Visit Summary and PPPS were made available to the patient.    Follow Up:   Next Medicare Wellness visit to be scheduled in 1 year.       Additional E&M Note during same encounter follows:  Patient has multiple medical problems which are significant and separately identifiable that require additional work above and beyond the Medicare Wellness Visit.      Chief Complaint  Medicare Wellness-subsequent    Subjective        Stephanie Ferro is also being seen today for AWV and follow up chronic conditions:     Hyperlipidemia  This is a chronic problem.    The problem is diet controlled.   As above: Cardiac calcium score of zero.   Continues diet  "modification:   Risk factors for coronary artery disease include family history and post-menopausal.       Anxiety  Presents for follow-up visit. Patient reports no chest pain, irritability, malaise, nervous/anxious behavior or shortness of breath. Symptoms occur rarely. The severity of symptoms is mild. The quality of sleep is good. Nighttime awakenings: occasional.     Her past medical history is significant for depression.   Depression  Visit Type: follow-up  Patient is not experiencing: irritability, malaise, nervousness/anxiety and shortness of breath.  Frequency of symptoms: rarely   Severity: mild   Sleep quality: good  Nighttime awakenings: occasional       Insomnia   1/2 trazodone at night is effective.     Chronic right shoulder pain: imaging 2021: normal   Dull ache with certain movements.  No weakness.   No new injury.  Would like to have PT     No longer on pepcid per patient: resolved.         Objective   Vital Signs:  /82 (BP Location: Left arm, Patient Position: Sitting, Cuff Size: Adult)   Pulse 73   Ht 157.5 cm (62\")   Wt 52.5 kg (115 lb 12.8 oz)   SpO2 99%   BMI 21.18 kg/m²     Physical Exam  Vitals reviewed.   Constitutional:       Appearance: Normal appearance. She is well-developed.   HENT:      Mouth/Throat:      Mouth: Mucous membranes are moist.      Pharynx: Oropharynx is clear. No posterior oropharyngeal erythema.   Eyes:      Pupils: Pupils are equal, round, and reactive to light.   Neck:      Thyroid: No thyromegaly.   Cardiovascular:      Rate and Rhythm: Normal rate and regular rhythm.      Pulses: Normal pulses.      Heart sounds: Normal heart sounds.   Pulmonary:      Effort: Pulmonary effort is normal.      Breath sounds: Normal breath sounds.      Comments: E/U   Abdominal:      General: Bowel sounds are normal.      Palpations: Abdomen is soft.   Musculoskeletal:         General: Normal range of motion.      Cervical back: Normal range of motion and neck supple.      " Right lower leg: No edema.      Left lower leg: No edema.   Lymphadenopathy:      Cervical: No cervical adenopathy.   Skin:     General: Skin is warm and dry.      Capillary Refill: Capillary refill takes 2 to 3 seconds.   Neurological:      Mental Status: She is alert and oriented to person, place, and time.   Psychiatric:         Mood and Affect: Mood normal.         Behavior: Behavior normal. Behavior is cooperative.         Thought Content: Thought content normal.         Judgment: Judgment normal.                         Assessment and Plan     Problem List Items Addressed This Visit          Cardiac and Vasculature    Hyperlipidemia (Chronic)    Current Assessment & Plan       Your triglycerides were mildly elevated.  Will recheck fasting.      You should cut back on sugar, carbohydrates (including white breads or items made with white flour), and limit alcohol if your currently consume. Increase your exercise level.     Increase consumption of fish: Atlantic [place], Bluefin Tuna.      Avoid high-glycemic and high-fructose foods and beverages: examples are below.   potatoes, pizza, pasta, white rice, shelby, candy, orange or apple juice.                Relevant Orders    Comprehensive Metabolic Panel    Lipid Panel With LDL / HDL Ratio    CBC (No Diff)       Family History    FH: premature coronary heart disease       Mental Health    Anxiety and depression (Chronic)    Overview     Since 1987          Current Assessment & Plan     Patient's depression is recurrent and is mild without psychosis. Their depression is currently in partial remission and the condition is unchanged. This will be reassessed at the next regular appointment. F/U as described:patient will continue current medication therapy.    Continue zoloft.             Musculoskeletal and Injuries    Osteoporosis (Chronic)    Overview     Prior treatment:  Fosamax  (stopped 2021)   Last dexa: 8/30/23 9/18/2023: restarted fosamax          Current  Assessment & Plan     Continue fosxamax.   Daily weight bearing exercises.   Check vitamin D             Sleep    Insomnia (Chronic)    Current Assessment & Plan     Continues trazodone: effective            Other Visit Diagnoses       Medicare annual wellness visit, subsequent    -  Primary    Encounter for screening mammogram for malignant neoplasm of breast        Relevant Orders    Mammo screening digital tomosynthesis bilateral w CAD    Vitamin D deficiency        Relevant Orders    Vitamin D 25 hydroxy    Chronic right shoulder pain        Relevant Orders    Ambulatory Referral to Physical Therapy (Completed)             Not fasting today: she will return for lab work.     I have placed a lab order for you at our outpatient [facility].     It is located on the first floor of our building at:   [address] [address]    You do not need an appointment.   It is open from Monday - Friday (except holidays) during normal business hours.   Generally 7:30am to 4pm.  They do close for 1/2 hour during lunch.       [] You do NOT need to be fasting for your lab work.     [x] You should be fasting: you may have water on the day of the lab.   Please ensure the meal the night before is lite and low fat.  No alcohol the night before.              Follow Up     Return in about 1 year (around 6/7/2025) for Medicare Wellness.    Patient was given instructions and counseling regarding her condition or for health maintenance advice. Please see specific information pulled into the AVS if appropriate.

## 2024-06-07 ENCOUNTER — OFFICE VISIT (OUTPATIENT)
Dept: INTERNAL MEDICINE | Facility: CLINIC | Age: 69
End: 2024-06-07
Payer: MEDICARE

## 2024-06-07 VITALS
OXYGEN SATURATION: 99 % | BODY MASS INDEX: 21.31 KG/M2 | WEIGHT: 115.8 LBS | HEIGHT: 62 IN | HEART RATE: 73 BPM | SYSTOLIC BLOOD PRESSURE: 118 MMHG | DIASTOLIC BLOOD PRESSURE: 82 MMHG

## 2024-06-07 DIAGNOSIS — Z82.49 FH: PREMATURE CORONARY HEART DISEASE: ICD-10-CM

## 2024-06-07 DIAGNOSIS — F32.A ANXIETY AND DEPRESSION: Chronic | ICD-10-CM

## 2024-06-07 DIAGNOSIS — M25.511 CHRONIC RIGHT SHOULDER PAIN: ICD-10-CM

## 2024-06-07 DIAGNOSIS — E55.9 VITAMIN D DEFICIENCY: ICD-10-CM

## 2024-06-07 DIAGNOSIS — M81.0 AGE-RELATED OSTEOPOROSIS WITHOUT CURRENT PATHOLOGICAL FRACTURE: ICD-10-CM

## 2024-06-07 DIAGNOSIS — F41.9 ANXIETY AND DEPRESSION: Chronic | ICD-10-CM

## 2024-06-07 DIAGNOSIS — Z00.00 MEDICARE ANNUAL WELLNESS VISIT, SUBSEQUENT: Primary | ICD-10-CM

## 2024-06-07 DIAGNOSIS — Z12.31 ENCOUNTER FOR SCREENING MAMMOGRAM FOR MALIGNANT NEOPLASM OF BREAST: ICD-10-CM

## 2024-06-07 DIAGNOSIS — E78.2 MIXED HYPERLIPIDEMIA: Chronic | ICD-10-CM

## 2024-06-07 DIAGNOSIS — G89.29 CHRONIC RIGHT SHOULDER PAIN: ICD-10-CM

## 2024-06-07 DIAGNOSIS — G47.00 INSOMNIA, UNSPECIFIED TYPE: Chronic | ICD-10-CM

## 2024-06-07 PROCEDURE — G0439 PPPS, SUBSEQ VISIT: HCPCS | Performed by: NURSE PRACTITIONER

## 2024-06-07 PROCEDURE — 1125F AMNT PAIN NOTED PAIN PRSNT: CPT | Performed by: NURSE PRACTITIONER

## 2024-06-07 PROCEDURE — 1170F FXNL STATUS ASSESSED: CPT | Performed by: NURSE PRACTITIONER

## 2024-06-07 PROCEDURE — 96160 PT-FOCUSED HLTH RISK ASSMT: CPT | Performed by: NURSE PRACTITIONER

## 2024-06-07 PROCEDURE — 1160F RVW MEDS BY RX/DR IN RCRD: CPT | Performed by: NURSE PRACTITIONER

## 2024-06-07 PROCEDURE — 99214 OFFICE O/P EST MOD 30 MIN: CPT | Performed by: NURSE PRACTITIONER

## 2024-06-07 PROCEDURE — 1159F MED LIST DOCD IN RCRD: CPT | Performed by: NURSE PRACTITIONER

## 2024-06-07 NOTE — ASSESSMENT & PLAN NOTE
Patient's depression is recurrent and is mild without psychosis. Their depression is currently in partial remission and the condition is unchanged. This will be reassessed at the next regular appointment. F/U as described:patient will continue current medication therapy.    Continue zoloft.

## 2024-06-07 NOTE — ASSESSMENT & PLAN NOTE
Your triglycerides were mildly elevated.  Will recheck fasting.      You should cut back on sugar, carbohydrates (including white breads or items made with white flour), and limit alcohol if your currently consume. Increase your exercise level.     Increase consumption of fish: Atlantic [place], Bluefin Tuna.      Avoid high-glycemic and high-fructose foods and beverages: examples are below.   potatoes, pizza, pasta, white rice, shelby, candy, orange or apple juice.

## 2024-06-14 ENCOUNTER — LAB (OUTPATIENT)
Facility: HOSPITAL | Age: 69
End: 2024-06-14
Payer: MEDICARE

## 2024-06-14 DIAGNOSIS — E78.2 MIXED HYPERLIPIDEMIA: Chronic | ICD-10-CM

## 2024-06-14 DIAGNOSIS — E55.9 VITAMIN D DEFICIENCY: ICD-10-CM

## 2024-06-14 LAB
25(OH)D3 SERPL-MCNC: 30.3 NG/ML (ref 30–100)
ALBUMIN SERPL-MCNC: 4.6 G/DL (ref 3.5–5.2)
ALBUMIN/GLOB SERPL: 2.1 G/DL
ALP SERPL-CCNC: 59 U/L (ref 39–117)
ALT SERPL W P-5'-P-CCNC: 14 U/L (ref 1–33)
ANION GAP SERPL CALCULATED.3IONS-SCNC: 7 MMOL/L (ref 5–15)
AST SERPL-CCNC: 13 U/L (ref 1–32)
BILIRUB SERPL-MCNC: 0.2 MG/DL (ref 0–1.2)
BUN SERPL-MCNC: 12 MG/DL (ref 8–23)
BUN/CREAT SERPL: 16.7 (ref 7–25)
CALCIUM SPEC-SCNC: 9.4 MG/DL (ref 8.6–10.5)
CHLORIDE SERPL-SCNC: 105 MMOL/L (ref 98–107)
CHOLEST SERPL-MCNC: 223 MG/DL (ref 0–200)
CO2 SERPL-SCNC: 29 MMOL/L (ref 22–29)
CREAT SERPL-MCNC: 0.72 MG/DL (ref 0.57–1)
DEPRECATED RDW RBC AUTO: 37.6 FL (ref 37–54)
EGFRCR SERPLBLD CKD-EPI 2021: 90.6 ML/MIN/1.73
ERYTHROCYTE [DISTWIDTH] IN BLOOD BY AUTOMATED COUNT: 11.6 % (ref 12.3–15.4)
GLOBULIN UR ELPH-MCNC: 2.2 GM/DL
GLUCOSE SERPL-MCNC: 93 MG/DL (ref 65–99)
HCT VFR BLD AUTO: 40.5 % (ref 34–46.6)
HDLC SERPL-MCNC: 47 MG/DL (ref 40–60)
HGB BLD-MCNC: 13.8 G/DL (ref 12–15.9)
LDLC SERPL CALC-MCNC: 153 MG/DL (ref 0–100)
LDLC/HDLC SERPL: 3.21 {RATIO}
MCH RBC QN AUTO: 30.5 PG (ref 26.6–33)
MCHC RBC AUTO-ENTMCNC: 34.1 G/DL (ref 31.5–35.7)
MCV RBC AUTO: 89.6 FL (ref 79–97)
PLATELET # BLD AUTO: 202 10*3/MM3 (ref 140–450)
PMV BLD AUTO: 9.5 FL (ref 6–12)
POTASSIUM SERPL-SCNC: 4.2 MMOL/L (ref 3.5–5.2)
PROT SERPL-MCNC: 6.8 G/DL (ref 6–8.5)
RBC # BLD AUTO: 4.52 10*6/MM3 (ref 3.77–5.28)
SODIUM SERPL-SCNC: 141 MMOL/L (ref 136–145)
TRIGL SERPL-MCNC: 126 MG/DL (ref 0–150)
VLDLC SERPL-MCNC: 23 MG/DL (ref 5–40)
WBC NRBC COR # BLD AUTO: 4.02 10*3/MM3 (ref 3.4–10.8)

## 2024-06-14 PROCEDURE — 80053 COMPREHEN METABOLIC PANEL: CPT

## 2024-06-14 PROCEDURE — 85027 COMPLETE CBC AUTOMATED: CPT

## 2024-06-14 PROCEDURE — 36415 COLL VENOUS BLD VENIPUNCTURE: CPT

## 2024-06-14 PROCEDURE — 80061 LIPID PANEL: CPT

## 2024-06-14 PROCEDURE — 82306 VITAMIN D 25 HYDROXY: CPT

## 2024-07-03 ENCOUNTER — APPOINTMENT (OUTPATIENT)
Dept: WOMENS IMAGING | Facility: HOSPITAL | Age: 69
End: 2024-07-03
Payer: MEDICARE

## 2024-07-03 PROCEDURE — 77063 BREAST TOMOSYNTHESIS BI: CPT | Performed by: RADIOLOGY

## 2024-07-03 PROCEDURE — 77067 SCR MAMMO BI INCL CAD: CPT | Performed by: RADIOLOGY

## 2024-07-12 DIAGNOSIS — F41.9 ANXIETY AND DEPRESSION: ICD-10-CM

## 2024-07-12 DIAGNOSIS — F32.A ANXIETY AND DEPRESSION: ICD-10-CM

## 2024-07-12 RX ORDER — TRAZODONE HYDROCHLORIDE 50 MG/1
TABLET ORAL
Qty: 90 TABLET | Refills: 1 | Status: SHIPPED | OUTPATIENT
Start: 2024-07-12

## 2024-07-12 RX ORDER — SERTRALINE HYDROCHLORIDE 100 MG/1
100 TABLET, FILM COATED ORAL DAILY
Qty: 90 TABLET | Refills: 1 | Status: SHIPPED | OUTPATIENT
Start: 2024-07-12

## 2024-11-02 DIAGNOSIS — M81.6 LOCALIZED OSTEOPOROSIS WITHOUT CURRENT PATHOLOGICAL FRACTURE: ICD-10-CM

## 2024-11-04 RX ORDER — ALENDRONATE SODIUM 70 MG/1
70 TABLET ORAL WEEKLY
Qty: 12 TABLET | Refills: 1 | Status: SHIPPED | OUTPATIENT
Start: 2024-11-04

## 2024-11-05 ENCOUNTER — OFFICE VISIT (OUTPATIENT)
Dept: INTERNAL MEDICINE | Facility: CLINIC | Age: 69
End: 2024-11-05
Payer: MEDICARE

## 2024-11-05 VITALS
HEART RATE: 83 BPM | WEIGHT: 112.8 LBS | OXYGEN SATURATION: 98 % | TEMPERATURE: 98.4 F | SYSTOLIC BLOOD PRESSURE: 120 MMHG | HEIGHT: 62 IN | BODY MASS INDEX: 20.76 KG/M2 | DIASTOLIC BLOOD PRESSURE: 72 MMHG

## 2024-11-05 DIAGNOSIS — J02.9 SORE THROAT: Primary | ICD-10-CM

## 2024-11-05 DIAGNOSIS — R09.82 POST-NASAL DRIP: ICD-10-CM

## 2024-11-05 LAB
EXPIRATION DATE: NORMAL
FLUAV AG UPPER RESP QL IA.RAPID: NOT DETECTED
FLUBV AG UPPER RESP QL IA.RAPID: NOT DETECTED
INTERNAL CONTROL: NORMAL
Lab: NORMAL
SARS-COV-2 AG UPPER RESP QL IA.RAPID: NOT DETECTED

## 2024-11-05 PROCEDURE — 1126F AMNT PAIN NOTED NONE PRSNT: CPT | Performed by: NURSE PRACTITIONER

## 2024-11-05 PROCEDURE — 99213 OFFICE O/P EST LOW 20 MIN: CPT | Performed by: NURSE PRACTITIONER

## 2024-11-05 PROCEDURE — 87428 SARSCOV & INF VIR A&B AG IA: CPT | Performed by: NURSE PRACTITIONER

## 2024-11-05 PROCEDURE — 1159F MED LIST DOCD IN RCRD: CPT | Performed by: NURSE PRACTITIONER

## 2024-11-05 PROCEDURE — 1160F RVW MEDS BY RX/DR IN RCRD: CPT | Performed by: NURSE PRACTITIONER

## 2024-11-05 RX ORDER — FLUTICASONE PROPIONATE 50 MCG
1 SPRAY, SUSPENSION (ML) NASAL 2 TIMES DAILY
Qty: 9.9 ML | Refills: 0 | Status: SHIPPED | OUTPATIENT
Start: 2024-11-05

## 2024-11-05 RX ORDER — BROMPHENIRAMINE MALEATE, PSEUDOEPHEDRINE HYDROCHLORIDE, AND DEXTROMETHORPHAN HYDROBROMIDE 2; 30; 10 MG/5ML; MG/5ML; MG/5ML
5 SYRUP ORAL 4 TIMES DAILY PRN
Qty: 120 ML | Refills: 0 | Status: SHIPPED | OUTPATIENT
Start: 2024-11-05

## 2024-11-05 NOTE — PROGRESS NOTES
Chief Complaint  Sore Throat (Started 4 -5 days ago ), Cough, and Fatigue     Subjective:      History of Present Illness {CC  Problem List  Visit  Diagnosis   Encounters  Notes  Medications  Labs  Result Review Imaging  Media :23}     Stephanie Ferro presents to [facility] for:        Sore Throat   This is a new problem. The current episode started in the past 7 days.   Pain is worse on both side(s). There has been no fever. The pain is at a severity of 4/10. Associated symptoms include coughing and a hoarse voice. Pertinent negatives include no abdominal pain, congestion, diarrhea, drooling, ear pain, neck pain, shortness of breath, swollen glands or trouble swallowing.   Additional comments: Runny nose         Answers submitted by the patient for this visit:  Primary Reason for Visit (Submitted on 11/4/2024)  What is the primary reason for your visit?: Sore Throat  Sore Throat Questionnaire (Submitted on 11/4/2024)  Chief Complaint: Sore throat  drainage: Yes      I have reviewed patient's medical history, any new submitted information provided by patient or medical assistant and updated medical record.      Objective:      Physical Exam  HENT:      Right Ear: Tympanic membrane normal.      Left Ear: Tympanic membrane normal.      Nose:      Comments: Turbinates, pale, boggy, moist.      Mouth/Throat:      Pharynx: No oropharyngeal exudate or posterior oropharyngeal erythema.   Eyes:      Conjunctiva/sclera: Conjunctivae normal.   Cardiovascular:      Rate and Rhythm: Normal rate.      Pulses: Normal pulses.      Heart sounds: Normal heart sounds.   Pulmonary:      Effort: Pulmonary effort is normal.      Breath sounds: Normal breath sounds. No wheezing.        Result Review  Data Reviewed:{ Labs  Result Review  Imaging  Med Tab  Media :23}                Negative covid and flu            Component  Ref Range & Units  9:42 AM  (11/5/24) 1 yr ago  (1/7/23) 1 yr  "ago  (1/7/23) 6 yr ago  (12/14/17)   SARS Antigen  Not Detected, Presumptive Negative Not Detected      Influenza A Antigen DORA  Not Detected Not Detected  Not Detected R    Influenza B Antigen DORA  Not Detected Not Detected  Not Detected R    Internal Control  Passed Passed Passed R Passed R Passed   Lot Number [id number]   Expiration Date 10/18/2025 122,023 [id number] 4/30/2019   Resulting Agency [facility]  [facility]                 Vital Signs:   /72 (BP Location: Left arm, Patient Position: Sitting, Cuff Size: Adult)   Pulse 83   Temp 98.4 °F (36.9 °C) (Oral)   Ht 157.5 cm (62\")   Wt 51.2 kg (112 lb 12.8 oz)   SpO2 98%   BMI 20.63 kg/m²   Estimated body mass index is 20.63 kg/m² as calculated from the following:    Height as of this encounter: 157.5 cm (62\").    Weight as of this encounter: 51.2 kg (112 lb 12.8 oz).        Requested Prescriptions     Signed Prescriptions Disp Refills    brompheniramine-pseudoephedrine-DM 30-2-10 MG/5ML syrup 120 mL 0     Sig: Take 5 mL by mouth 4 (Four) Times a Day As Needed for Cough or Congestion.    fluticasone (FLONASE) 50 MCG/ACT nasal spray 9.9 mL 0     Sig: Administer 1 spray into the nostril(s) as directed by provider 2 (Two) Times a Day.       Routine medications provided by this office will also be refilled via pharmacy request.       Current Outpatient Medications:     alendronate (FOSAMAX) 70 MG tablet, TAKE 1 TABLET BY MOUTH ONCE WEEKLY ON AN EMPTY STOMACH BEFORE BREAKFAST. REMAIN UPRIGHT FOR 30 MINUTES AND TAKE WITH 8 OUNCES OF WATER, Disp: 12 tablet, Rfl: 1    sertraline (ZOLOFT) 100 MG tablet, TAKE 1 TABLET BY MOUTH DAILY, Disp: 90 tablet, Rfl: 1    traZODone (DESYREL) 50 MG tablet, TAKE ONE HALF TO ONE TABLET BY MOUTH ONCE NIGHTLY AS NEEDED FOR INSOMNIA, Disp: 90 tablet, Rfl: 1    brompheniramine-pseudoephedrine-DM 30-2-10 MG/5ML syrup, Take 5 mL by mouth 4 " (Four) Times a Day As Needed for Cough or Congestion., Disp: 120 mL, Rfl: 0    fluticasone (FLONASE) 50 MCG/ACT nasal spray, Administer 1 spray into the nostril(s) as directed by provider 2 (Two) Times a Day., Disp: 9.9 mL, Rfl: 0     Assessment and Plan:      Assessment and Plan {CC Problem List  Visit Diagnosis  ROS  Review (Popup)  Health Maintenance  Quality  BestPractice  Medications  SmartSets  SnapShot Encounters  Media :23}     Diagnoses and all orders for this visit:    1. Sore throat (Primary)  -     POCT SARS-CoV-2 Antigen DORA + Flu  -     brompheniramine-pseudoephedrine-DM 30-2-10 MG/5ML syrup; Take 5 mL by mouth 4 (Four) Times a Day As Needed for Cough or Congestion.  Dispense: 120 mL; Refill: 0  -     fluticasone (FLONASE) 50 MCG/ACT nasal spray; Administer 1 spray into the nostril(s) as directed by provider 2 (Two) Times a Day.  Dispense: 9.9 mL; Refill: 0    2. Post-nasal drip  -     brompheniramine-pseudoephedrine-DM 30-2-10 MG/5ML syrup; Take 5 mL by mouth 4 (Four) Times a Day As Needed for Cough or Congestion.  Dispense: 120 mL; Refill: 0             New Medications Ordered This Visit   Medications    brompheniramine-pseudoephedrine-DM 30-2-10 MG/5ML syrup     Sig: Take 5 mL by mouth 4 (Four) Times a Day As Needed for Cough or Congestion.     Dispense:  120 mL     Refill:  0    fluticasone (FLONASE) 50 MCG/ACT nasal spray     Sig: Administer 1 spray into the nostril(s) as directed by provider 2 (Two) Times a Day.     Dispense:  9.9 mL     Refill:  0           Follow Up {Instructions Charge Capture  Follow-up Communications :23}     Return if symptoms worsen or fail to improve.      Patient was given instructions and counseling regarding her condition or for health maintenance advice. Please see specific information pulled into the AVS if appropriate.    Dragon disclaimer:   Much of this encounter note is an electronic transcription/translation of spoken language to printed text. The  electronic translation of spoken language may permit erroneous, or at times, nonsensical words or phrases to be inadvertently transcribed; Although I have reviewed the note for such errors, some may still exist.     Additional Patient Counseling:       Patient Instructions     You are diagnosed with acute nasopharyngitis, or a common cold, which is likely viral. Viruses are not killed by antibiotics and therefore an antibiotic is not prescribed for you today. Viral illnesses typically last 3-10 days in duration and are treated symptomatically.     COVID/ flu can not be ruled out as symptoms are similar and may not yet show up in testing.  Continue to monitor symptoms and report any significant change.     Suggestions for over the counter medications:   Many over the counter medications are great at symptomatic relief. For example: Tussin DM or its generic version is good for loosening up a cough and suppressing it.     Antihistamine:   [] Zyrtec   [] Allegra  [] Xyzal     **Bromphed sent to pharmacy.     As with any medication, prescription or over the counter, you must monitor for blood pressure effects or medication interactions when taking these types of medicines.   Rest and increasing fluids are the mainstay of treatment.   To prevent spread of infection, cough or sneeze into a tissue and throw away. Wash hands, eating utensils, toothbrushes well after use.   For increase in symptoms or concerns, follow up with your primary care provider.     Warm salt water rinses.     Nasal spray given today with instructions on use: once spray to each nostril twice a day for at least 2 weeks.     Cough:     Honey and Lemon Tea for cough  1 Tbsp lemon juice   2 Tbsp honey   1/2 cup or more of hot water  Directions:   Put honey and lemon juice into a tea cup or mug. Add hot water and stir. Add more lemon juice, honey, or hot water to taste.  Sip on this and have two or three per day while cough is present.     [] Tessalon

## 2024-11-05 NOTE — PATIENT INSTRUCTIONS
You are diagnosed with acute nasopharyngitis, or a common cold, which is likely viral. Viruses are not killed by antibiotics and therefore an antibiotic is not prescribed for you today. Viral illnesses typically last 3-10 days in duration and are treated symptomatically.     COVID/ flu can not be ruled out as symptoms are similar and may not yet show up in testing.  Continue to monitor symptoms and report any significant change.     Suggestions for over the counter medications:   Many over the counter medications are great at symptomatic relief. For example: Tussin DM or its generic version is good for loosening up a cough and suppressing it.     Antihistamine:   [] Zyrtec   [] Allegra  [] Xyzal     **Bromphed sent to pharmacy.     As with any medication, prescription or over the counter, you must monitor for blood pressure effects or medication interactions when taking these types of medicines.   Rest and increasing fluids are the mainstay of treatment.   To prevent spread of infection, cough or sneeze into a tissue and throw away. Wash hands, eating utensils, toothbrushes well after use.   For increase in symptoms or concerns, follow up with your primary care provider.     Warm salt water rinses.     Nasal spray given today with instructions on use: once spray to each nostril twice a day for at least 2 weeks.     Cough:     Honey and Lemon Tea for cough  1 Tbsp lemon juice   2 Tbsp honey   1/2 cup or more of hot water  Directions:   Put honey and lemon juice into a tea cup or mug. Add hot water and stir. Add more lemon juice, honey, or hot water to taste.  Sip on this and have two or three per day while cough is present.     [] Tessalon

## 2024-11-27 DIAGNOSIS — F32.A ANXIETY AND DEPRESSION: ICD-10-CM

## 2024-11-27 DIAGNOSIS — F41.9 ANXIETY AND DEPRESSION: ICD-10-CM

## 2024-11-27 RX ORDER — SERTRALINE HYDROCHLORIDE 100 MG/1
100 TABLET, FILM COATED ORAL DAILY
Qty: 90 TABLET | Refills: 0 | Status: SHIPPED | OUTPATIENT
Start: 2024-11-27

## 2024-12-13 ENCOUNTER — OFFICE VISIT (OUTPATIENT)
Dept: INTERNAL MEDICINE | Facility: CLINIC | Age: 69
End: 2024-12-13
Payer: MEDICARE

## 2024-12-13 VITALS
TEMPERATURE: 98.4 F | DIASTOLIC BLOOD PRESSURE: 64 MMHG | BODY MASS INDEX: 20.89 KG/M2 | HEART RATE: 71 BPM | OXYGEN SATURATION: 98 % | SYSTOLIC BLOOD PRESSURE: 116 MMHG | WEIGHT: 114.2 LBS

## 2024-12-13 DIAGNOSIS — Z76.89 ENCOUNTER TO ESTABLISH CARE: ICD-10-CM

## 2024-12-13 DIAGNOSIS — R10.2 VAGINAL PAIN: Primary | ICD-10-CM

## 2024-12-13 PROCEDURE — 99213 OFFICE O/P EST LOW 20 MIN: CPT

## 2024-12-13 PROCEDURE — 1126F AMNT PAIN NOTED NONE PRSNT: CPT

## 2024-12-13 NOTE — PROGRESS NOTES
Chief Complaint  Vaginal Pain     Subjective:      History of Present Illness {CC  Problem List  Visit  Diagnosis   Encounters  Notes  Medications  Labs  Result Review Imaging  Media :23}     Stephanie Ferro presents to [facility] for:      History of Present Illness        The patient is a 69-year-old female who presents for evaluation of clitoral pain.    She reports experiencing intermittent, sharp tenderness in the region of her clitoris, which she first noticed 1 to 2 days ago. The pain is not constant and is particularly noticeable during urination, although it is not localized to the urethra. She also experiences discomfort when wiping the area. She recalls a similar episode of pain approximately 1 to 2 weeks prior, which subsequently resolved. She has attempted self-examination using a mirror but found it challenging due to shadowing. She does not use soap for internal vaginal cleaning but only for the external labia. She is sexually active without penetration and did not experience any pain during her last sexual encounter, which occurred 10 days ago. She has not consulted a gynecologist since her menopause and has not undergone a hysterectomy. She has not detected any abnormalities such as lumps or roughness upon self-examination. The pain is absent during sitting but can be triggered by certain movements or contact between her legs. She also reports that the pain is inconsistent, sometimes present upon touch and other times absent.    FAMILY HISTORY  Her mother  of breast cancer, diagnosed at age 69.         I have reviewed patient's medical history, any new submitted information provided by patient or medical assistant and updated medical record.      Objective:      Physical Exam  Vitals reviewed. Exam conducted with a chaperone present.   Constitutional:       Appearance: Normal appearance.   HENT:      Head: Normocephalic.   Cardiovascular:      Rate  and Rhythm: Normal rate and regular rhythm.      Pulses: Normal pulses.      Heart sounds: Normal heart sounds.   Pulmonary:      Effort: Pulmonary effort is normal.      Breath sounds: Normal breath sounds.   Genitourinary:     General: Normal vulva.      Pubic Area: No rash.       Vagina: No signs of injury. No vaginal discharge, erythema, tenderness or lesions.   Skin:     General: Skin is warm and dry.      Capillary Refill: Capillary refill takes less than 2 seconds.   Neurological:      General: No focal deficit present.      Mental Status: She is alert.   Psychiatric:         Mood and Affect: Mood normal.         Behavior: Behavior normal.        Result Review  Data Reviewed:{ Labs  Result Review  Imaging  Med Tab  Media :23}     Results                    Vital Signs:   /64 (BP Location: Left arm, Patient Position: Sitting, Cuff Size: Adult)   Pulse 71   Temp 98.4 °F (36.9 °C) (Oral)   Wt 51.8 kg (114 lb 3.2 oz)   SpO2 98%   BMI 20.89 kg/m²                 Requested Prescriptions      No prescriptions requested or ordered in this encounter       Routine medications provided by this office will also be refilled via pharmacy request.       Current Outpatient Medications:     alendronate (FOSAMAX) 70 MG tablet, TAKE 1 TABLET BY MOUTH ONCE WEEKLY ON AN EMPTY STOMACH BEFORE BREAKFAST. REMAIN UPRIGHT FOR 30 MINUTES AND TAKE WITH 8 OUNCES OF WATER, Disp: 12 tablet, Rfl: 1    brompheniramine-pseudoephedrine-DM 30-2-10 MG/5ML syrup, Take 5 mL by mouth 4 (Four) Times a Day As Needed for Cough or Congestion., Disp: 120 mL, Rfl: 0    fluticasone (FLONASE) 50 MCG/ACT nasal spray, Administer 1 spray into the nostril(s) as directed by provider 2 (Two) Times a Day., Disp: 9.9 mL, Rfl: 0    sertraline (ZOLOFT) 100 MG tablet, TAKE 1 TABLET BY MOUTH DAILY, Disp: 90 tablet, Rfl: 0    traZODone (DESYREL) 50 MG tablet, TAKE ONE HALF TO ONE TABLET BY MOUTH ONCE NIGHTLY AS NEEDED FOR INSOMNIA, Disp: 90 tablet, Rfl:  1     Assessment and Plan:      Assessment and Plan {CC Problem List  Visit Diagnosis  ROS  Review (Popup)  Health Maintenance  Quality  BestPractice  Medications  SmartSets  SnapShot Encounters  Media :23}     Problem List Items Addressed This Visit    None  Visit Diagnoses       Vaginal pain    -  Primary    Encounter to establish care        Relevant Orders    Ambulatory Referral to Obstetrics / Gynecology               1. Clitoral pain.  The patient's symptoms may be attributed to microtears in the thin and sensitive skin of the clitoral region, potentially caused by activities such as wiping. These microtears typically exhibit rapid healing. She was advised to monitor for any signs of irritation and to avoid the use of soap in the affected area to prevent dryness and pH imbalance. A referral to a gynecologist was provided for further evaluation and to establish a baseline for future reference. She was also encouraged to undergo regular pelvic exams every 1 to 3 years.     Patient verbalizes understanding and is comfortable with the plan of care.      Follow Up {Instructions Charge Capture  Follow-up Communications :23}     Return if symptoms worsen or fail to improve.      Patient was given instructions and counseling regarding her condition or for health maintenance advice. Please see specific information pulled into the AVS if appropriate.    Dragon disclaimer:   Much of this encounter note is an electronic transcription/translation of spoken language to printed text. The electronic translation of spoken language may permit erroneous, or at times, nonsensical words or phrases to be inadvertently transcribed; Although I have reviewed the note for such errors, some may still exist.         Additional Patient Counseling:       There are no Patient Instructions on file for this visit.    Patient or patient representative verbalized consent for the use of Ambient Listening during the visit with   DARRYL Chaudhary for chart documentation. 1/10/2025  10:26 EST

## 2025-05-16 DIAGNOSIS — F41.9 ANXIETY AND DEPRESSION: ICD-10-CM

## 2025-05-16 DIAGNOSIS — F32.A ANXIETY AND DEPRESSION: ICD-10-CM

## 2025-05-19 RX ORDER — TRAZODONE HYDROCHLORIDE 50 MG/1
TABLET ORAL
Qty: 90 TABLET | Refills: 1 | Status: SHIPPED | OUTPATIENT
Start: 2025-05-19

## 2025-05-19 RX ORDER — SERTRALINE HYDROCHLORIDE 100 MG/1
100 TABLET, FILM COATED ORAL DAILY
Qty: 90 TABLET | Refills: 1 | Status: SHIPPED | OUTPATIENT
Start: 2025-05-19

## 2025-05-19 NOTE — TELEPHONE ENCOUNTER
Rx Refill Note  Requested Prescriptions     Pending Prescriptions Disp Refills    traZODone (DESYREL) 50 MG tablet [Pharmacy Med Name: traZODone 50 MG TABLET] 90 tablet 1     Sig: TAKE ONE HALF TO ONE TABLET BY MOUTH ONCE NIGHTLY AS NEEDED FOR INSOMNIA    sertraline (ZOLOFT) 100 MG tablet [Pharmacy Med Name: SERTRALINE  MG TABLET] 90 tablet 1     Sig: TAKE 1 TABLET BY MOUTH DAILY      Last office visit with prescribing clinician: 11/5/2024  Next office visit with prescribing clinician: 6/9/2025         Jacque Poon MA  05/19/25, 07:43 EDT

## 2025-05-25 DIAGNOSIS — M81.6 LOCALIZED OSTEOPOROSIS WITHOUT CURRENT PATHOLOGICAL FRACTURE: ICD-10-CM

## 2025-05-27 RX ORDER — ALENDRONATE SODIUM 70 MG/1
70 TABLET ORAL WEEKLY
Qty: 12 TABLET | Refills: 1 | Status: SHIPPED | OUTPATIENT
Start: 2025-05-27

## 2025-06-24 ENCOUNTER — TRANSCRIBE ORDERS (OUTPATIENT)
Dept: ADMINISTRATIVE | Facility: HOSPITAL | Age: 70
End: 2025-06-24
Payer: MEDICARE

## 2025-06-24 DIAGNOSIS — Z12.31 SCREENING MAMMOGRAM FOR BREAST CANCER: Primary | ICD-10-CM

## 2025-06-30 ENCOUNTER — HOSPITAL ENCOUNTER (OUTPATIENT)
Dept: MAMMOGRAPHY | Facility: HOSPITAL | Age: 70
Discharge: HOME | End: 2025-06-30
Admitting: NURSE PRACTITIONER
Payer: MEDICARE

## 2025-06-30 DIAGNOSIS — Z12.31 SCREENING MAMMOGRAM FOR BREAST CANCER: ICD-10-CM

## 2025-06-30 PROCEDURE — 77063 BREAST TOMOSYNTHESIS BI: CPT

## 2025-06-30 PROCEDURE — 77067 SCR MAMMO BI INCL CAD: CPT

## 2025-08-20 ENCOUNTER — OFFICE VISIT (OUTPATIENT)
Dept: INTERNAL MEDICINE | Facility: CLINIC | Age: 70
End: 2025-08-20
Payer: MEDICARE

## 2025-08-20 VITALS
HEART RATE: 73 BPM | OXYGEN SATURATION: 98 % | HEIGHT: 62 IN | DIASTOLIC BLOOD PRESSURE: 64 MMHG | WEIGHT: 114.2 LBS | BODY MASS INDEX: 21.02 KG/M2 | SYSTOLIC BLOOD PRESSURE: 110 MMHG

## 2025-08-20 DIAGNOSIS — Z00.00 MEDICARE ANNUAL WELLNESS VISIT, SUBSEQUENT: Primary | ICD-10-CM

## 2025-08-20 DIAGNOSIS — G47.00 INSOMNIA, UNSPECIFIED TYPE: Chronic | ICD-10-CM

## 2025-08-20 DIAGNOSIS — M81.0 AGE-RELATED OSTEOPOROSIS WITHOUT CURRENT PATHOLOGICAL FRACTURE: ICD-10-CM

## 2025-08-20 DIAGNOSIS — E78.2 MIXED HYPERLIPIDEMIA: ICD-10-CM

## 2025-08-20 DIAGNOSIS — F41.9 ANXIETY AND DEPRESSION: Chronic | ICD-10-CM

## 2025-08-20 DIAGNOSIS — R92.333 HETEROGENEOUSLY DENSE TISSUE OF BOTH BREASTS ON MAMMOGRAPHY: ICD-10-CM

## 2025-08-20 DIAGNOSIS — E55.9 VITAMIN D DEFICIENCY: ICD-10-CM

## 2025-08-20 DIAGNOSIS — F32.A ANXIETY AND DEPRESSION: Chronic | ICD-10-CM

## 2025-08-20 LAB
25(OH)D3+25(OH)D2 SERPL-MCNC: 49.8 NG/ML (ref 30–100)
ALBUMIN SERPL-MCNC: 4.9 G/DL (ref 3.5–5.2)
ALBUMIN/GLOB SERPL: 2.2 G/DL
ALP SERPL-CCNC: 70 U/L (ref 39–117)
ALT SERPL-CCNC: 15 U/L (ref 1–33)
AST SERPL-CCNC: 18 U/L (ref 1–32)
BILIRUB SERPL-MCNC: 0.2 MG/DL (ref 0–1.2)
BUN SERPL-MCNC: 13 MG/DL (ref 8–23)
BUN/CREAT SERPL: 16.7 (ref 7–25)
CALCIUM SERPL-MCNC: 10.4 MG/DL (ref 8.6–10.5)
CHLORIDE SERPL-SCNC: 102 MMOL/L (ref 98–107)
CHOLEST SERPL-MCNC: 233 MG/DL (ref 0–200)
CO2 SERPL-SCNC: 28.9 MMOL/L (ref 22–29)
CREAT SERPL-MCNC: 0.78 MG/DL (ref 0.57–1)
EGFRCR SERPLBLD CKD-EPI 2021: 81.8 ML/MIN/1.73
ERYTHROCYTE [DISTWIDTH] IN BLOOD BY AUTOMATED COUNT: 11.8 % (ref 12.3–15.4)
GLOBULIN SER CALC-MCNC: 2.2 GM/DL
GLUCOSE SERPL-MCNC: 67 MG/DL (ref 65–99)
HCT VFR BLD AUTO: 42.9 % (ref 34–46.6)
HDLC SERPL-MCNC: 60 MG/DL (ref 40–60)
HGB BLD-MCNC: 14.5 G/DL (ref 12–15.9)
LDLC SERPL CALC-MCNC: 148 MG/DL (ref 0–100)
LDLC/HDLC SERPL: 2.42 {RATIO}
MCH RBC QN AUTO: 30.4 PG (ref 26.6–33)
MCHC RBC AUTO-ENTMCNC: 33.8 G/DL (ref 31.5–35.7)
MCV RBC AUTO: 89.9 FL (ref 79–97)
PLATELET # BLD AUTO: 204 10*3/MM3 (ref 140–450)
POTASSIUM SERPL-SCNC: 4.3 MMOL/L (ref 3.5–5.2)
PROT SERPL-MCNC: 7.1 G/DL (ref 6–8.5)
RBC # BLD AUTO: 4.77 10*6/MM3 (ref 3.77–5.28)
SODIUM SERPL-SCNC: 142 MMOL/L (ref 136–145)
TRIGL SERPL-MCNC: 138 MG/DL (ref 0–150)
VLDLC SERPL CALC-MCNC: 25 MG/DL (ref 5–40)
WBC # BLD AUTO: 5.33 10*3/MM3 (ref 3.4–10.8)

## (undated) DEVICE — THE TORRENT IRRIGATION SCOPE CONNECTOR IS USED WITH THE TORRENT IRRIGATION TUBING TO PROVIDE IRRIGATION FLUIDS SUCH AS STERILE WATER DURING GASTROINTESTINAL ENDOSCOPIC PROCEDURES WHEN USED IN CONJUNCTION WITH AN IRRIGATION PUMP (OR ELECTROSURGICAL UNIT).: Brand: TORRENT

## (undated) DEVICE — TUBING, SUCTION, 1/4" X 10', STRAIGHT: Brand: MEDLINE

## (undated) DEVICE — SINGLE-USE BIOPSY FORCEPS: Brand: RADIAL JAW 4

## (undated) DEVICE — Device: Brand: DEFENDO AIR/WATER/SUCTION AND BIOPSY VALVE

## (undated) DEVICE — CANN NASL CO2 TRULINK W/O2 A/